# Patient Record
Sex: MALE | Race: BLACK OR AFRICAN AMERICAN | Employment: OTHER | URBAN - METROPOLITAN AREA
[De-identification: names, ages, dates, MRNs, and addresses within clinical notes are randomized per-mention and may not be internally consistent; named-entity substitution may affect disease eponyms.]

---

## 2017-03-22 PROBLEM — R56.9 SEIZURE (HCC): Status: ACTIVE | Noted: 2017-03-22

## 2017-05-06 PROBLEM — R56.9 SEIZURE (HCC): Chronic | Status: ACTIVE | Noted: 2017-03-22

## 2017-05-12 ENCOUNTER — HOSPITAL ENCOUNTER (OUTPATIENT)
Dept: LAB | Age: 67
Discharge: HOME | End: 2017-05-12
Attending: INTERNAL MEDICINE
Payer: COMMERCIAL

## 2017-05-12 LAB
CHLORIDE UR-SCNC: 149 MMOL/L
POTASSIUM UR-SCNC: 109 MMOL/L
SODIUM UR-SCNC: 46 MMOL/L

## 2017-05-12 PROCEDURE — 82436 ASSAY OF URINE CHLORIDE: CPT | Performed by: NURSE PRACTITIONER

## 2017-05-12 PROCEDURE — 84300 ASSAY OF URINE SODIUM: CPT | Performed by: NURSE PRACTITIONER

## 2017-05-12 PROCEDURE — 82384 ASSAY THREE CATECHOLAMINES: CPT | Performed by: NURSE PRACTITIONER

## 2017-05-12 PROCEDURE — 84133 ASSAY OF URINE POTASSIUM: CPT | Performed by: NURSE PRACTITIONER

## 2017-05-17 LAB
CREAT UR-MCNC: 255.7 MG/DL
DOPAMINE UG/G CREAT: 222 UG/G CREAT (ref 0–348)
DOPAMINE UR-MCNC: 567 UG/L
EPINEPH UR-MCNC: 46 UG/L
EPINEPHRINE UG/G CREAT: 18 UG/G CREAT (ref 0–19)
NOREPINEPH UR-MCNC: 116 UG/L
NOREPINEPHRINE UG/G CREAT: 45 UG/G CREAT (ref 0–111)

## 2017-09-22 ENCOUNTER — HOSPITAL ENCOUNTER (OUTPATIENT)
Dept: ULTRASOUND IMAGING | Age: 67
Discharge: HOME | End: 2017-09-22
Attending: UROLOGY
Payer: COMMERCIAL

## 2017-09-22 DIAGNOSIS — N50.89 SCROTAL MASS: ICD-10-CM

## 2017-09-22 DIAGNOSIS — N44.2 TESTICULAR CYST: ICD-10-CM

## 2017-09-22 PROCEDURE — 76870 US EXAM SCROTUM: CPT

## 2017-09-23 NOTE — PROGRESS NOTES
Please let pt know that he has several intratesticular and extratesticular cysts. No concerning masses. Will discuss at follow up.

## 2018-09-07 PROBLEM — E27.8 ADRENAL MASS (HCC): Status: ACTIVE | Noted: 2018-09-07

## 2019-03-20 PROBLEM — E53.8 VITAMIN B 12 DEFICIENCY: Status: ACTIVE | Noted: 2019-03-20

## 2019-03-20 PROBLEM — M10.00 IDIOPATHIC GOUT: Status: ACTIVE | Noted: 2019-03-20

## 2019-03-20 PROBLEM — E78.2 MIXED HYPERLIPIDEMIA: Status: ACTIVE | Noted: 2019-03-20

## 2019-03-20 PROBLEM — R60.0 LOCALIZED EDEMA: Status: ACTIVE | Noted: 2019-03-20

## 2019-07-12 ENCOUNTER — HOSPITAL ENCOUNTER (OUTPATIENT)
Dept: SURGERY | Age: 69
Discharge: HOME | End: 2019-07-12
Payer: MEDICARE

## 2019-07-12 VITALS
HEIGHT: 74 IN | TEMPERATURE: 98.2 F | OXYGEN SATURATION: 100 % | SYSTOLIC BLOOD PRESSURE: 158 MMHG | WEIGHT: 232.4 LBS | RESPIRATION RATE: 16 BRPM | BODY MASS INDEX: 29.82 KG/M2 | DIASTOLIC BLOOD PRESSURE: 84 MMHG

## 2019-07-12 LAB
ANION GAP SERPL CALC-SCNC: 9 MMOL/L (ref 7–16)
APPEARANCE UR: CLEAR
BACTERIA URNS QL MICRO: 0 /HPF
BILIRUB UR QL: NEGATIVE
BUN SERPL-MCNC: 13 MG/DL (ref 8–23)
CALCIUM SERPL-MCNC: 9.1 MG/DL (ref 8.3–10.4)
CHLORIDE SERPL-SCNC: 106 MMOL/L (ref 98–107)
CO2 SERPL-SCNC: 29 MMOL/L (ref 21–32)
COLOR UR: YELLOW
CREAT SERPL-MCNC: 1.01 MG/DL (ref 0.8–1.5)
ERYTHROCYTE [DISTWIDTH] IN BLOOD BY AUTOMATED COUNT: 13 % (ref 11.9–14.6)
GLUCOSE SERPL-MCNC: 85 MG/DL (ref 65–100)
GLUCOSE UR STRIP.AUTO-MCNC: NEGATIVE MG/DL
HCT VFR BLD AUTO: 36.4 % (ref 41.1–50.3)
HGB BLD-MCNC: 11.7 G/DL (ref 13.6–17.2)
HGB UR QL STRIP: NEGATIVE
KETONES UR QL STRIP.AUTO: NEGATIVE MG/DL
LEUKOCYTE ESTERASE UR QL STRIP.AUTO: NEGATIVE
MCH RBC QN AUTO: 28.4 PG (ref 26.1–32.9)
MCHC RBC AUTO-ENTMCNC: 32.1 G/DL (ref 31.4–35)
MCV RBC AUTO: 88.3 FL (ref 79.6–97.8)
NITRITE UR QL STRIP.AUTO: NEGATIVE
NRBC # BLD: 0 K/UL (ref 0–0.2)
PH UR STRIP: 8 [PH] (ref 5–9)
PLATELET # BLD AUTO: 210 K/UL (ref 150–450)
PMV BLD AUTO: 8.8 FL (ref 9.4–12.3)
POTASSIUM SERPL-SCNC: 3.4 MMOL/L (ref 3.5–5.1)
PROT UR STRIP-MCNC: NEGATIVE MG/DL
RBC # BLD AUTO: 4.12 M/UL (ref 4.23–5.6)
SODIUM SERPL-SCNC: 144 MMOL/L (ref 136–145)
SP GR UR REFRACTOMETRY: 1.01 (ref 1–1.02)
UROBILINOGEN UR QL STRIP.AUTO: 0.2 EU/DL (ref 0.2–1)
WBC # BLD AUTO: 4.7 K/UL (ref 4.3–11.1)

## 2019-07-12 PROCEDURE — 80048 BASIC METABOLIC PNL TOTAL CA: CPT

## 2019-07-12 PROCEDURE — 87086 URINE CULTURE/COLONY COUNT: CPT

## 2019-07-12 PROCEDURE — 81003 URINALYSIS AUTO W/O SCOPE: CPT

## 2019-07-12 PROCEDURE — 85027 COMPLETE CBC AUTOMATED: CPT

## 2019-07-12 RX ORDER — LANOLIN ALCOHOL/MO/W.PET/CERES
1000 CREAM (GRAM) TOPICAL
COMMUNITY

## 2019-07-12 NOTE — PERIOP NOTES
Patient verified name and     Order for consent found in EHR and matches case posting; patient verified. Type 1B surgery, assessment complete. Labs per surgeon: CBC, BMP, UA and Urine Culture  Labs per anesthesia protocol: none    Hibiclens and instructions given per hospital policy. Patient provided with and instructed on educational handouts including Guide to Surgery, Pain Management, Hand Hygiene, Blood Transfusion Education, and [facility] Brochure. Patient answered medical/surgical history questions at their best of ability. All prior to admission medications documented in [facility]. Original medication prescription list visualized during patient appointment. Patient instructed to hold all vitamins 7 days prior to surgery and NSAIDS 5 days prior to surgery, patient verbalized understanding. Prescription medications to hold: 2 of his daily 81 mg Aspirin. Nunu Gerardo with Dr. Pratik Godoy office to notify her that patient takes 3 of the 81mg Aspirins daily secondary to stroke and asked if they wanted him to remain on 243 mg daily or reduce to just one 81mg tablet until after surgery. Malini Yarbrough stated he is to cut down to just one 81 mg Aspirin daily until after surgery    Patient instructed to continue previous medications as prescribed prior to surgery and to take the following medications the day of surgery according to anesthesia guidelines with a small sip of water: Allopurinol, Amlodipine, Aspirin, Atenolol, Dilantin, Keppra, Phenytek and Ranitidine. Patient teach back successful and patient demonstrates knowledge of instructions.

## 2019-07-12 NOTE — PERIOP NOTES
Recent Results (from the past 12 hour(s))   CBC W/O DIFF    Collection Time: 07/12/19  1:35 PM   Result Value Ref Range    WBC 4.7 4.3 - 11.1 K/uL    RBC 4.12 (L) 4.23 - 5.6 M/uL    HGB 11.7 (L) 13.6 - 17.2 g/dL    HCT 36.4 (L) 41.1 - 50.3 %    MCV 88.3 79.6 - 97.8 FL    MCH 28.4 26.1 - 32.9 PG    MCHC 32.1 31.4 - 35.0 g/dL    RDW 13.0 11.9 - 14.6 %    PLATELET 235 769 - 854 K/uL    MPV 8.8 (L) 9.4 - 12.3 FL    ABSOLUTE NRBC 0.00 0.0 - 0.2 K/uL   METABOLIC PANEL, BASIC    Collection Time: 07/12/19  1:35 PM   Result Value Ref Range    Sodium 144 136 - 145 mmol/L    Potassium 3.4 (L) 3.5 - 5.1 mmol/L    Chloride 106 98 - 107 mmol/L    CO2 29 21 - 32 mmol/L    Anion gap 9 7 - 16 mmol/L    Glucose 85 65 - 100 mg/dL    BUN 13 8 - 23 MG/DL    Creatinine 1.01 0.8 - 1.5 MG/DL    GFR est AA >60 >60 ml/min/1.73m2    GFR est non-AA >60 >60 ml/min/1.73m2    Calcium 9.1 8.3 - 10.4 MG/DL   URINALYSIS W/ RFLX MICROSCOPIC    Collection Time: 07/12/19  1:35 PM   Result Value Ref Range    Color YELLOW      Appearance CLEAR      Specific gravity 1.010 1.001 - 1.023      pH (UA) 8.0 5.0 - 9.0      Protein NEGATIVE  NEG mg/dL    Glucose NEGATIVE  NEG mg/dL    Ketone NEGATIVE  NEG mg/dL    Bilirubin NEGATIVE  NEG      Blood NEGATIVE  NEG      Urobilinogen 0.2 0.2 - 1.0 EU/dL    Nitrites NEGATIVE  NEG      Leukocyte Esterase NEGATIVE  NEG      Bacteria 0 0 /hpf      Results reviewed, no further action required.

## 2019-07-15 LAB
BACTERIA SPEC CULT: NORMAL
SERVICE CMNT-IMP: NORMAL

## 2019-07-17 ENCOUNTER — ANESTHESIA EVENT (OUTPATIENT)
Dept: SURGERY | Age: 69
End: 2019-07-17
Payer: MEDICARE

## 2019-07-18 ENCOUNTER — ANESTHESIA (OUTPATIENT)
Dept: SURGERY | Age: 69
End: 2019-07-18
Payer: MEDICARE

## 2019-07-18 ENCOUNTER — HOSPITAL ENCOUNTER (OUTPATIENT)
Age: 69
Setting detail: OUTPATIENT SURGERY
Discharge: HOME | End: 2019-07-18
Attending: UROLOGY | Admitting: UROLOGY
Payer: MEDICARE

## 2019-07-18 VITALS
DIASTOLIC BLOOD PRESSURE: 73 MMHG | OXYGEN SATURATION: 95 % | SYSTOLIC BLOOD PRESSURE: 143 MMHG | BODY MASS INDEX: 29.79 KG/M2 | RESPIRATION RATE: 16 BRPM | HEART RATE: 62 BPM | WEIGHT: 232 LBS | TEMPERATURE: 98 F

## 2019-07-18 DIAGNOSIS — R31.0 GROSS HEMATURIA: Primary | ICD-10-CM

## 2019-07-18 LAB — POTASSIUM BLD-SCNC: 3.6 MMOL/L (ref 3.5–5.1)

## 2019-07-18 PROCEDURE — 76210000020 HC REC RM PH II FIRST 0.5 HR: Performed by: UROLOGY

## 2019-07-18 PROCEDURE — 74011250637 HC RX REV CODE- 250/637: Performed by: ANESTHESIOLOGY

## 2019-07-18 PROCEDURE — 84132 ASSAY OF SERUM POTASSIUM: CPT

## 2019-07-18 PROCEDURE — 88305 TISSUE EXAM BY PATHOLOGIST: CPT

## 2019-07-18 PROCEDURE — 74011250636 HC RX REV CODE- 250/636: Performed by: ANESTHESIOLOGY

## 2019-07-18 PROCEDURE — 77030019927 HC TBNG IRR CYSTO BAXT -A: Performed by: UROLOGY

## 2019-07-18 PROCEDURE — 76060000032 HC ANESTHESIA 0.5 TO 1 HR: Performed by: UROLOGY

## 2019-07-18 PROCEDURE — 74011250636 HC RX REV CODE- 250/636

## 2019-07-18 PROCEDURE — 77030010509 HC AIRWY LMA MSK TELE -A: Performed by: NURSE ANESTHETIST, CERTIFIED REGISTERED

## 2019-07-18 PROCEDURE — C1758 CATHETER, URETERAL: HCPCS | Performed by: UROLOGY

## 2019-07-18 PROCEDURE — 74011000250 HC RX REV CODE- 250

## 2019-07-18 PROCEDURE — 77030032490 HC SLV COMPR SCD KNE COVD -B: Performed by: UROLOGY

## 2019-07-18 PROCEDURE — 76010000138 HC OR TIME 0.5 TO 1 HR: Performed by: UROLOGY

## 2019-07-18 PROCEDURE — 74011250636 HC RX REV CODE- 250/636: Performed by: UROLOGY

## 2019-07-18 PROCEDURE — 76210000063 HC OR PH I REC FIRST 0.5 HR: Performed by: UROLOGY

## 2019-07-18 PROCEDURE — 74011636320 HC RX REV CODE- 636/320: Performed by: UROLOGY

## 2019-07-18 PROCEDURE — 77030018832 HC SOL IRR H20 ICUM -A: Performed by: UROLOGY

## 2019-07-18 RX ORDER — FENTANYL CITRATE 50 UG/ML
INJECTION, SOLUTION INTRAMUSCULAR; INTRAVENOUS AS NEEDED
Status: DISCONTINUED | OUTPATIENT
Start: 2019-07-18 | End: 2019-07-18 | Stop reason: HOSPADM

## 2019-07-18 RX ORDER — HYDROMORPHONE HYDROCHLORIDE 2 MG/ML
0.5 INJECTION, SOLUTION INTRAMUSCULAR; INTRAVENOUS; SUBCUTANEOUS
Status: DISCONTINUED | OUTPATIENT
Start: 2019-07-18 | End: 2019-07-18 | Stop reason: HOSPADM

## 2019-07-18 RX ORDER — LIDOCAINE HYDROCHLORIDE 20 MG/ML
INJECTION, SOLUTION EPIDURAL; INFILTRATION; INTRACAUDAL; PERINEURAL AS NEEDED
Status: DISCONTINUED | OUTPATIENT
Start: 2019-07-18 | End: 2019-07-18 | Stop reason: HOSPADM

## 2019-07-18 RX ORDER — MIDAZOLAM HYDROCHLORIDE 1 MG/ML
2 INJECTION, SOLUTION INTRAMUSCULAR; INTRAVENOUS
Status: DISCONTINUED | OUTPATIENT
Start: 2019-07-18 | End: 2019-07-18 | Stop reason: HOSPADM

## 2019-07-18 RX ORDER — CIPROFLOXACIN 2 MG/ML
400 INJECTION, SOLUTION INTRAVENOUS
Status: COMPLETED | OUTPATIENT
Start: 2019-07-18 | End: 2019-07-18

## 2019-07-18 RX ORDER — ACETAMINOPHEN 500 MG
1000 TABLET ORAL ONCE
Status: COMPLETED | OUTPATIENT
Start: 2019-07-18 | End: 2019-07-18

## 2019-07-18 RX ORDER — EPHEDRINE SULFATE 50 MG/ML
INJECTION, SOLUTION INTRAVENOUS AS NEEDED
Status: DISCONTINUED | OUTPATIENT
Start: 2019-07-18 | End: 2019-07-18 | Stop reason: HOSPADM

## 2019-07-18 RX ORDER — ONDANSETRON 2 MG/ML
INJECTION INTRAMUSCULAR; INTRAVENOUS AS NEEDED
Status: DISCONTINUED | OUTPATIENT
Start: 2019-07-18 | End: 2019-07-18 | Stop reason: HOSPADM

## 2019-07-18 RX ORDER — OXYCODONE HYDROCHLORIDE 5 MG/1
10 TABLET ORAL
Status: DISCONTINUED | OUTPATIENT
Start: 2019-07-18 | End: 2019-07-18 | Stop reason: HOSPADM

## 2019-07-18 RX ORDER — HYDROCODONE BITARTRATE AND ACETAMINOPHEN 5; 325 MG/1; MG/1
1 TABLET ORAL
Qty: 10 TAB | Refills: 0 | Status: SHIPPED | OUTPATIENT
Start: 2019-07-18 | End: 2019-07-21

## 2019-07-18 RX ORDER — LIDOCAINE HYDROCHLORIDE 10 MG/ML
0.3 INJECTION INFILTRATION; PERINEURAL ONCE
Status: DISCONTINUED | OUTPATIENT
Start: 2019-07-18 | End: 2019-07-18 | Stop reason: HOSPADM

## 2019-07-18 RX ORDER — GLYCOPYRROLATE 0.2 MG/ML
INJECTION INTRAMUSCULAR; INTRAVENOUS AS NEEDED
Status: DISCONTINUED | OUTPATIENT
Start: 2019-07-18 | End: 2019-07-18 | Stop reason: HOSPADM

## 2019-07-18 RX ORDER — PROPOFOL 10 MG/ML
INJECTION, EMULSION INTRAVENOUS AS NEEDED
Status: DISCONTINUED | OUTPATIENT
Start: 2019-07-18 | End: 2019-07-18 | Stop reason: HOSPADM

## 2019-07-18 RX ORDER — DEXAMETHASONE SODIUM PHOSPHATE 4 MG/ML
INJECTION, SOLUTION INTRA-ARTICULAR; INTRALESIONAL; INTRAMUSCULAR; INTRAVENOUS; SOFT TISSUE AS NEEDED
Status: DISCONTINUED | OUTPATIENT
Start: 2019-07-18 | End: 2019-07-18 | Stop reason: HOSPADM

## 2019-07-18 RX ORDER — SODIUM CHLORIDE, SODIUM LACTATE, POTASSIUM CHLORIDE, CALCIUM CHLORIDE 600; 310; 30; 20 MG/100ML; MG/100ML; MG/100ML; MG/100ML
100 INJECTION, SOLUTION INTRAVENOUS CONTINUOUS
Status: DISCONTINUED | OUTPATIENT
Start: 2019-07-18 | End: 2019-07-18 | Stop reason: HOSPADM

## 2019-07-18 RX ADMIN — PROPOFOL 200 MG: 10 INJECTION, EMULSION INTRAVENOUS at 12:17

## 2019-07-18 RX ADMIN — GLYCOPYRROLATE 0.2 MG: 0.2 INJECTION INTRAMUSCULAR; INTRAVENOUS at 12:36

## 2019-07-18 RX ADMIN — DEXAMETHASONE SODIUM PHOSPHATE 4 MG: 4 INJECTION, SOLUTION INTRA-ARTICULAR; INTRALESIONAL; INTRAMUSCULAR; INTRAVENOUS; SOFT TISSUE at 12:24

## 2019-07-18 RX ADMIN — EPHEDRINE SULFATE 10 MG: 50 INJECTION, SOLUTION INTRAVENOUS at 12:40

## 2019-07-18 RX ADMIN — FENTANYL CITRATE 25 MCG: 50 INJECTION, SOLUTION INTRAMUSCULAR; INTRAVENOUS at 12:24

## 2019-07-18 RX ADMIN — LIDOCAINE HYDROCHLORIDE 100 MG: 20 INJECTION, SOLUTION EPIDURAL; INFILTRATION; INTRACAUDAL; PERINEURAL at 12:17

## 2019-07-18 RX ADMIN — ONDANSETRON 4 MG: 2 INJECTION INTRAMUSCULAR; INTRAVENOUS at 12:24

## 2019-07-18 RX ADMIN — SODIUM CHLORIDE, SODIUM LACTATE, POTASSIUM CHLORIDE, AND CALCIUM CHLORIDE 100 ML/HR: 600; 310; 30; 20 INJECTION, SOLUTION INTRAVENOUS at 11:02

## 2019-07-18 RX ADMIN — CIPROFLOXACIN 400 MG: 2 INJECTION, SOLUTION INTRAVENOUS at 12:17

## 2019-07-18 RX ADMIN — ACETAMINOPHEN 1000 MG: 500 TABLET, FILM COATED ORAL at 11:01

## 2019-07-18 NOTE — BRIEF OP NOTE
BRIEF OPERATIVE NOTE    Date of Procedure: 7/18/2019   Preoperative Diagnosis: Lesion of bladder [N32.9]  Gross hematuria [R31.0]  Postoperative Diagnosis: Lesion of bladder [N32.9]  Gross hematuria [R31.0]    Procedure(s):  CYSTOSCOPY WITH BLADDER BIOPSIES W/ FULGERATION AND BILATERAL RETROGRADE PYELOGRAMS  Surgeon(s) and Role:     * Valentino Hawk DO - Primary         Surgical Assistant: None    Surgical Staff:  Circ-1: Judson Gold  Circ-2: Rene Leos RN  Event Time In Time Out   Incision Start 1236    Incision Close 1245      Anesthesia: General   Estimated Blood Loss: <5cc  Specimens:   ID Type Source Tests Collected by Time Destination   1 : bladder biopsies X2 Preservative Bladder  Domi Palomino DO 7/18/2019 1237 Pathology      Findings: see op note  Complications: none immediate  Implants: * No implants in log *

## 2019-07-18 NOTE — DISCHARGE INSTRUCTIONS
Patient Education        Cystoscopy: What to Expect at [address] [facility]    A cystoscopy is a procedure that lets a doctor look inside of the bladder and the urethra. The urethra is the tube that carries urine from the bladder to outside the body. The doctor uses a thin, lighted tool called a cystoscope. Your bladder is filled with fluid. This stretches the bladder so that your doctor can look closely at the inside of your bladder. After the cystoscopy, your urethra may be sore at first, and it may burn when you urinate for the first few days after the procedure. You may feel the need to urinate more often, and your urine may be pink. These symptoms should get better in 1 or 2 days. You will probably be able to go back to most of your usual activities in 1 or 2 days. This care sheet gives you a general idea about how long it will take for you to recover. But each person recovers at a different pace. Follow the steps below to get better as quickly as possible. How can you care for yourself at home? Activity    · Rest when you feel tired. Getting enough sleep will help you recover.     · Try to walk each day. Start by walking a little more than you did the day before. Bit by bit, increase the amount you walk. Walking boosts blood flow and helps prevent pneumonia and constipation.     · Avoid strenuous activities, such as bicycle riding, jogging, weight lifting, or aerobic exercise, until your doctor says it is okay.     · Ask your doctor when you can drive again.     · Most people are able to return to work within 1 or 2 days after the procedure.     · You may shower and take baths as usual.     · Ask your doctor when it is okay for you to have sex. Diet    · You can eat your normal diet. If your stomach is upset, try bland, low-fat foods like plain rice, broiled chicken, toast, and yogurt.     · Drink plenty of fluids (unless your doctor tells you not to).    Medicines    · Take pain medicines exactly as directed. ? If the doctor gave you a prescription medicine for pain, take it as prescribed. ? If you are not taking a prescription pain medicine, ask your doctor if you can take an over-the-counter medicine.     · If you think your pain medicine is making you sick to your stomach:  ? Take your medicine after meals (unless your doctor has told you not to). ? Ask your doctor for a different pain medicine.     · If your doctor prescribed antibiotics, take them as directed. Do not stop taking them just because you feel better. You need to take the full course of antibiotics. Follow-up care is a key part of your treatment and safety. Be sure to make and go to all appointments, and call your doctor if you are having problems. It's also a good idea to know your test results and keep a list of the medicines you take. When should you call for help? Call 911 anytime you think you may need emergency care. For example, call if:    · You passed out (lost consciousness).     · You have severe trouble breathing.     · You have sudden chest pain and shortness of breath, or you cough up blood.     · You have severe belly pain.    Call your doctor now or seek immediate medical care if:    · You are sick to your stomach or cannot keep fluids down.     · Your urine is still red or you see blood clots after you have urinated several times.     · You have trouble passing urine or stool, especially if you have pain or swelling in your lower belly.     · You have signs of a blood clot, such as:  ? Pain in your calf, back of the knee, thigh, or groin. ? Redness and swelling in your leg or groin.     · You develop a fever or severe chills.     · You have pain in your back just below your rib cage. This is called flank pain.    Watch closely for changes in your health, and be sure to contact your doctor if:    · You have pain or burning when you urinate.  A burning feeling is normal for a day or two after the test, but call if it does not get better.     · You have a frequent urge to urinate but can pass only small amounts of urine.     · Your urine is pink, red, or cloudy, or smells bad. It is normal for the urine to have a pinkish color for a few days after the test, but call if it does not get better. Where can you learn more? Go to http://nichelle-namrata.info/. Enter J372 in the search box to learn more about \"Cystoscopy: What to Expect at Home. \"  Current as of: March 20, 2018  Content Version: 11.9  © 5020-1565 [employer]. Care instructions adapted under license by [employer] (which disclaims liability or warranty for this information). If you have questions about a medical condition or this instruction, always ask your healthcare professional. [place] [address] any warranty or liability for your use of this information. TYPICAL SIDE EFFECTS OF PAIN MEDICATION:  *    Constipation: Drink lots of fluids. Over the counter stool softener if needed. *    Nausea: Take pain medication with food. Call your doctor with persistent nausea. ACTIVITY  · As tolerated and as directed by your doctor. · Bathe or shower as directed by your doctor. DIET  · Day of surgery: Clear liquids until no nausea or vomiting; small portion, light diet [place] foods (ex: baked chicken, plain rice, grits, scrambled eggs, toast). Nothing greasy, fried or spicy today. · Advance to regular diet on second day, unless your doctor orders otherwise. · If nausea and vomiting continues, call your doctor. PAIN  · Take pain medication as directed by your doctor. · DO NOT take aspirin or blood thinners unless directed by your doctor.        CALL YOUR DOCTOR IF    s Call your doctor if pain is NOT relieved by medication.   s Excessive bleeding that does not stop after holding pressure over the area  · Temperature of 101 degrees F or above  · Excessive redness, swelling or bruising, and/ or green or yellow, smelly discharge from incision    AFTER ANESTHESIA   · For the first 24 hours: DO NOT Drive, Drink alcoholic beverages, or Make important decisions. · Be aware of dizziness following anesthesia and while taking pain medication. DISCHARGE SUMMARY from Nurse    PATIENT INSTRUCTIONS:    After general anesthesia or intravenous sedation, for 24 hours or while taking prescription Narcotics:  · Limit your activities  · Do not drive and operate hazardous machinery  · Do not make important personal or business decisions  · Do  not drink alcoholic beverages  · If you have not urinated within 8 hours after discharge, please contact your surgeon on call. *  Please give a list of your current medications to your Primary Care Provider. *  Please update this list whenever your medications are discontinued, doses are      changed, or new medications (including over-the-counter products) are added. *  Please carry medication information at all times in case of emergency situations. Preventing Infection at Home  We care about preventing infection and avoiding the spread of germs - not only when you are in the hospital but also when you return home. When you return home from the hospital, its important to take the following steps to help prevent infection and avoid spreading germs that could infect you and others. Ask everyone in your home to follow these guidelines, too. Clean Your Hands  · Clean your hands whenever your hands are visibly dirty, before you eat, before or after touching your mouth, nose or eyes, and before preparing food. Clean them after contact with body fluids, using the restroom, touching animals or changing diapers. · When washing hands, wet them with warm water and work up a lather. Rub hands for at least 15 seconds, then rinse them and pat them dry with a clean towel or paper towel. · When using hand sanitizers, it should take about 15 seconds to rub your hands dry.  If not, you probably didnt apply enough . Cover Your Sneeze or Cough  Germs are released into the air whenever you sneeze or cough. To prevent the spread of infection:  · Turn away from other people before coughing or sneezing. · Cover your mouth or nose with a tissue when you cough or sneeze. Put the tissue in the trash. · If you dont have a tissue, cough or sneeze into your upper sleeve, not your hands. · Always clean your hands after coughing or sneezing. Care for Wounds  Your skin is your bodys first line of defense against germs, but an open wound leaves an easy way for germs to enter your body. To prevent infection:  · Clean your hands before and after changing wound dressings, and wear gloves to change dressings if recommended by your doctor. · Take special care with IV lines or other devices inserted into the body. If you must touch them, clean your hands first.  · Follow any specific instructions from your doctor to care for your wounds. Contact your doctor if you experience any signs of infection, such as fever or increased redness at the surgical or wound site. Keep a Clean Home  · Clean or wipe commonly touched hard surfaces like door handles, sinks, tabletops, phones and TV remotes. · Use products labeled disinfectant to kill harmful bacteria and viruses. · Use a clean cloth or paper towel to clean and dry surfaces. Wiping surfaces with a dirty dishcloth, sponge or towel will only spread germs. · Never share toothbrushes, ross, drinking glasses, utensils, razor blades, face cloths or bath towels to avoid spreading germs. · Be sure that the linens that you sleep on are clean. · Keep pets away from wounds and wash your hands after touching pets, their toys or bedding. We care about you and your health. Remember, preventing infections is a team effort between you, your family, friends and health care providers.        These are general instructions for a healthy lifestyle:    No smoking/ No tobacco products/ Avoid exposure to second hand smoke    Surgeon General's Warning:  Quitting smoking now greatly reduces serious risk to your health. Obesity, smoking, and sedentary lifestyle greatly increases your risk for illness    A healthy diet, regular physical exercise & weight monitoring are important for maintaining a healthy lifestyle    You may be retaining fluid if you have a history of heart failure or if you experience any of the following symptoms:  Weight gain of 3 pounds or more overnight or 5 pounds in a week, increased swelling in our hands or feet or shortness of breath while lying flat in bed. Please call your doctor as soon as you notice any of these symptoms; do not wait until your next office visit. Recognize signs and symptoms of STROKE:    F-face looks uneven    A-arms unable to move or move unevenly    S-speech slurred or non-existent    T-time-call 911 as soon as signs and symptoms begin-DO NOT go       Back to bed or wait to see if you get better-TIME IS BRAIN.

## 2019-07-18 NOTE — ANESTHESIA PREPROCEDURE EVALUATION
Relevant Problems   No relevant active problems       Anesthetic History               Review of Systems / Medical History  Patient summary reviewed and pertinent labs reviewed    Pulmonary        Sleep apnea           Neuro/Psych     seizures  CVA: no residual symptoms       Cardiovascular    Hypertension              Exercise tolerance: >4 METS     GI/Hepatic/Renal     GERD      PUD     Endo/Other             Other Findings              Physical Exam    Airway  Mallampati: II  TM Distance: > 6 cm  Neck ROM: normal range of motion   Mouth opening: Normal     Cardiovascular  Regular rate and rhythm,  S1 and S2 normal,  no murmur, click, rub, or gallop  Rhythm: regular  Rate: normal         Dental    Dentition: Full upper dentures     Pulmonary  Breath sounds clear to auscultation               Abdominal         Other Findings            Anesthetic Plan    ASA: 3  Anesthesia type: general            Anesthetic plan and risks discussed with: Patient

## 2019-07-18 NOTE — ANESTHESIA POSTPROCEDURE EVALUATION
Procedure(s):  CYSTOSCOPY WITH BLADDER BIOPSIES W/ FULGARATION AND BILATERAL RETROGRADE PYELOGRAM.    general    Anesthesia Post Evaluation      Multimodal analgesia: multimodal analgesia used between 6 hours prior to anesthesia start to PACU discharge  Patient location during evaluation: PACU  Patient participation: complete - patient participated  Level of consciousness: awake  Pain management: adequate  Airway patency: patent  Anesthetic complications: no  Cardiovascular status: acceptable  Respiratory status: acceptable  Hydration status: acceptable  Post anesthesia nausea and vomiting:  none      Vitals Value Taken Time   /68 7/18/2019  1:15 PM   Temp 36.7 °C (98 °F) 7/18/2019  1:15 PM   Pulse 58 7/18/2019  1:16 PM   Resp 18 7/18/2019  1:15 PM   SpO2 98 % 7/18/2019  1:16 PM   Vitals shown include unvalidated device data.

## 2019-07-19 NOTE — OP NOTES
[address] [facility]  OPERATIVE REPORT    Name:  Donna Gaxiola  MR#:  [id number]  :  1950  ACCOUNT #:  [de-identified]  DATE OF SERVICE:  2019      PREOPERATIVE DIAGNOSES:  Gross hematuria and abnormal bladder urothelium. POSTOPERATIVE DIAGNOSES:  Gross hematuria and abnormal bladder urothelium. PROCEDURES PERFORMED:  Cystoscopy, bladder biopsies, fulguration of abnormal bladder urothelium, and bilateral retrograde pyelograms. SURGEON:  Henry Hawk DO    ASSISTANT:  None. ANESTHESIA:  General.    SPECIMENS REMOVED:  Bladder biopsies. COMPLICATIONS:  None immediate. ESTIMATED BLOOD LOSS:  <5cc    IMPLANTS:  none    CLINICAL HISTORY:  This is a 79-year-old gentleman who I have been following for a history of gross hematuria. Recent cystoscopy showed some abnormal bladder urothelium. I have recommended the above-mentioned procedures. All risks, benefits and alternatives to the procedure have been reviewed and he is willing to proceed at this time. DESCRIPTION OF OPERATIVE PROCEDURE:  The patient consent was obtained. The patient was brought back to the operating room at which time he was placed in the supine position. After the uneventful induction of general anesthesia, he was then placed in a dorsal lithotomy position. His genital area was prepped and draped and a sterile field applied. A 22-Burmese cystoscope was inserted into the urethra and advanced into the bladder under direct visualization a mild bulbourethral stricture was noted that easily accepted the scope. Mild bilobar hypertrophy of the prostate was seen. The bladder was systematically surveyed. Two small areas of raised and injected urothelium were seen on the posterior bladder wall. Both of these areas were biopsied using a cold cup biopsy forceps. All abnormal urothelium as well as biopsy sites were fulgurated.   I inspected the bladder multiple times to ensure there were no other mucosal abnormalities noted. None were seen. Single bilateral ureteral orifices were seen in their normal orthotopic position. Bilateral retrograde pyelogram was performed using a 5-Bahraini cone-tipped catheter. No filling defects or dilation was seen bilaterally. Prompt excretion was noted bilaterally. The patient's bladder was drained. The procedure was terminated. The patient tolerated the procedure well. I plan to see him back in the office in two weeks. I will reattempt to schedule his CT prior to this appointment. INTERPRETATION OF BILATERAL RETROGRADE PYELOGRAMS:  Bilateral retrograde pyelograms were performed using a 5-Bahraini cone-tipped catheter. No filling defects or dilation was seen bilaterally.       LILA ELIAS DO      [id number]  D:  07/18/2019 12:54  T:  07/18/2019 13:02  JOB #:  [id number]

## 2019-07-25 PROBLEM — N30.21 CHRONIC CYSTITIS WITH HEMATURIA: Status: ACTIVE | Noted: 2019-07-25

## 2019-08-13 ENCOUNTER — HOSPITAL ENCOUNTER (OUTPATIENT)
Dept: CT IMAGING | Age: 69
Discharge: HOME | End: 2019-08-13
Attending: UROLOGY
Payer: MEDICARE

## 2019-08-13 VITALS — HEIGHT: 74 IN | BODY MASS INDEX: 29.52 KG/M2 | WEIGHT: 230 LBS

## 2019-08-13 DIAGNOSIS — N28.89 RENAL MASS: ICD-10-CM

## 2019-08-13 DIAGNOSIS — R31.0 GROSS HEMATURIA: ICD-10-CM

## 2019-08-13 LAB — CREAT BLD-MCNC: 0.9 MG/DL (ref 0.8–1.5)

## 2019-08-13 PROCEDURE — 74011636320 HC RX REV CODE- 636/320: Performed by: UROLOGY

## 2019-08-13 PROCEDURE — 82565 ASSAY OF CREATININE: CPT

## 2019-08-13 PROCEDURE — 74178 CT ABD&PLV WO CNTR FLWD CNTR: CPT

## 2019-08-13 PROCEDURE — 74011000258 HC RX REV CODE- 258: Performed by: UROLOGY

## 2019-08-13 RX ORDER — SODIUM CHLORIDE 0.9 % (FLUSH) 0.9 %
10 SYRINGE (ML) INJECTION
Status: COMPLETED | OUTPATIENT
Start: 2019-08-13 | End: 2019-08-13

## 2019-08-13 RX ADMIN — Medication 10 ML: at 15:30

## 2019-08-13 RX ADMIN — SODIUM CHLORIDE 100 ML: 900 INJECTION, SOLUTION INTRAVENOUS at 15:30

## 2019-08-13 RX ADMIN — IOPAMIDOL 100 ML: 755 INJECTION, SOLUTION INTRAVENOUS at 15:30

## 2019-09-19 ENCOUNTER — HOSPITAL ENCOUNTER (OUTPATIENT)
Dept: GENERAL RADIOLOGY | Age: 69
Discharge: HOME | End: 2019-09-19
Attending: UROLOGY
Payer: MEDICARE

## 2019-09-19 ENCOUNTER — HOSPITAL ENCOUNTER (OUTPATIENT)
Dept: SURGERY | Age: 69
Discharge: HOME | End: 2019-09-19
Payer: MEDICARE

## 2019-09-19 VITALS
WEIGHT: 232 LBS | HEART RATE: 55 BPM | DIASTOLIC BLOOD PRESSURE: 87 MMHG | SYSTOLIC BLOOD PRESSURE: 153 MMHG | TEMPERATURE: 98.5 F | OXYGEN SATURATION: 99 % | BODY MASS INDEX: 29.77 KG/M2 | RESPIRATION RATE: 18 BRPM | HEIGHT: 74 IN

## 2019-09-19 LAB
ALBUMIN SERPL-MCNC: 4.1 G/DL (ref 3.2–4.6)
ALBUMIN/GLOB SERPL: 1.2 {RATIO} (ref 1.2–3.5)
ALP SERPL-CCNC: 150 U/L (ref 50–136)
ALT SERPL-CCNC: 21 U/L (ref 12–65)
ANION GAP SERPL CALC-SCNC: 7 MMOL/L (ref 7–16)
APPEARANCE UR: CLEAR
APTT PPP: 25.2 SEC (ref 24.7–39.8)
AST SERPL-CCNC: 21 U/L (ref 15–37)
BACTERIA URNS QL MICRO: 0 /HPF
BILIRUB SERPL-MCNC: 0.2 MG/DL (ref 0.2–1.1)
BILIRUB UR QL: NEGATIVE
BUN SERPL-MCNC: 17 MG/DL (ref 8–23)
CALCIUM SERPL-MCNC: 9.7 MG/DL (ref 8.3–10.4)
CASTS URNS QL MICRO: ABNORMAL /LPF
CHLORIDE SERPL-SCNC: 108 MMOL/L (ref 98–107)
CO2 SERPL-SCNC: 28 MMOL/L (ref 21–32)
COLOR UR: YELLOW
CREAT SERPL-MCNC: 1.05 MG/DL (ref 0.8–1.5)
EPI CELLS #/AREA URNS HPF: ABNORMAL /HPF
ERYTHROCYTE [DISTWIDTH] IN BLOOD BY AUTOMATED COUNT: 13 % (ref 11.9–14.6)
GLOBULIN SER CALC-MCNC: 3.4 G/DL (ref 2.3–3.5)
GLUCOSE SERPL-MCNC: 77 MG/DL (ref 65–100)
GLUCOSE UR STRIP.AUTO-MCNC: NEGATIVE MG/DL
HCT VFR BLD AUTO: 37.7 % (ref 41.1–50.3)
HGB BLD-MCNC: 12.1 G/DL (ref 13.6–17.2)
HGB UR QL STRIP: NEGATIVE
INR PPP: 1
KETONES UR QL STRIP.AUTO: NEGATIVE MG/DL
LEUKOCYTE ESTERASE UR QL STRIP.AUTO: NEGATIVE
MCH RBC QN AUTO: 28.4 PG (ref 26.1–32.9)
MCHC RBC AUTO-ENTMCNC: 32.1 G/DL (ref 31.4–35)
MCV RBC AUTO: 88.5 FL (ref 79.6–97.8)
NITRITE UR QL STRIP.AUTO: NEGATIVE
NRBC # BLD: 0 K/UL (ref 0–0.2)
PH UR STRIP: 7 [PH] (ref 5–9)
PLATELET # BLD AUTO: 166 K/UL (ref 150–450)
PMV BLD AUTO: 10 FL (ref 9.4–12.3)
POTASSIUM SERPL-SCNC: 3.6 MMOL/L (ref 3.5–5.1)
PROT SERPL-MCNC: 7.5 G/DL (ref 6.3–8.2)
PROT UR STRIP-MCNC: ABNORMAL MG/DL
PROTHROMBIN TIME: 13.3 SEC (ref 11.7–14.5)
RBC # BLD AUTO: 4.26 M/UL (ref 4.23–5.6)
RBC #/AREA URNS HPF: ABNORMAL /HPF
SODIUM SERPL-SCNC: 143 MMOL/L (ref 136–145)
SP GR UR REFRACTOMETRY: 1.01 (ref 1–1.02)
UROBILINOGEN UR QL STRIP.AUTO: 0.2 EU/DL (ref 0.2–1)
WBC # BLD AUTO: 4.2 K/UL (ref 4.3–11.1)
WBC URNS QL MICRO: ABNORMAL /HPF

## 2019-09-19 PROCEDURE — 80053 COMPREHEN METABOLIC PANEL: CPT

## 2019-09-19 PROCEDURE — 85730 THROMBOPLASTIN TIME PARTIAL: CPT

## 2019-09-19 PROCEDURE — 87086 URINE CULTURE/COLONY COUNT: CPT

## 2019-09-19 PROCEDURE — 81003 URINALYSIS AUTO W/O SCOPE: CPT

## 2019-09-19 PROCEDURE — 93005 ELECTROCARDIOGRAM TRACING: CPT | Performed by: ANESTHESIOLOGY

## 2019-09-19 PROCEDURE — 85027 COMPLETE CBC AUTOMATED: CPT

## 2019-09-19 PROCEDURE — 85610 PROTHROMBIN TIME: CPT

## 2019-09-19 PROCEDURE — 36415 COLL VENOUS BLD VENIPUNCTURE: CPT

## 2019-09-19 PROCEDURE — 71046 X-RAY EXAM CHEST 2 VIEWS: CPT

## 2019-09-19 NOTE — PERIOP NOTES
Patient verified name and . Patient provided medical/health information and PTA medications to the best of their ability. TYPE  CASE:3  Order for consent yes found in EHR and matches case posting. Labs per surgeon:cbc,bmp,pt,ptt urine,urine cx,type and cross. Results: -  Labs per anesthesia protocol: none. Results -  EKG  :  yes    Patient provided with and instructed on education handouts including Guide to Surgery, blood transfusions, pain management, and hand hygiene for the family and community, and [place] [address] [facility] brochure.     hibiclens and instructions given per hospital policy. Instructed patient to continue previous medications as prescribed prior to surgery unless otherwise directed and to take the following medications the day of surgery according to anesthesia guidelines : allopurinol,amlodipine,dilantin,atenolol,keppra,phentoyn,zantac, start aspirin 81 mg at night. Instructed patient to hold  the following medications: aspirin 243 mg,bit b-12. Original medication prescription bottles none visualized during patient appointment. Patient teach back successful and patient demonstrates knowledge of instruction.

## 2019-09-19 NOTE — PERIOP NOTES
Pt instructed to return to outpatient entrance on 9-25-29 between 8-4 tor type and cross-lab draw.  Pt verbalized understanding

## 2019-09-21 LAB
ATRIAL RATE: 48 BPM
CALCULATED P AXIS, ECG09: 81 DEGREES
CALCULATED R AXIS, ECG10: 37 DEGREES
CALCULATED T AXIS, ECG11: 83 DEGREES
DIAGNOSIS, 93000: NORMAL
P-R INTERVAL, ECG05: 268 MS
Q-T INTERVAL, ECG07: 436 MS
QRS DURATION, ECG06: 118 MS
QTC CALCULATION (BEZET), ECG08: 389 MS
VENTRICULAR RATE, ECG03: 48 BPM

## 2019-09-22 LAB
BACTERIA SPEC CULT: NORMAL
SERVICE CMNT-IMP: NORMAL

## 2019-09-25 ENCOUNTER — ANESTHESIA EVENT (OUTPATIENT)
Dept: SURGERY | Age: 69
DRG: 658 | End: 2019-09-25
Payer: MEDICARE

## 2019-09-25 NOTE — PERIOP NOTES
At 2 pm, left voice mail message for patient on home phone requesting return call. Patient has not yet had T&C and surgery is scheduled for tomorrow. Called again [phone] and reached patient. Discussed that he needs T&C today for surgery tomorrow. Verified with main registration that if patient comes in today, before 5 pm, can still get this done. If after 5 pm, needs to go through ED to get T&C. Patient concerned it will \"get dark\" before he can get this done and get back home. Reassured to please come now as this is needed for surgery tomorrow. Patient states he \"can't make any promises. \"  Self called and spoke with Gayathri Fraser RN in main preop to inform.

## 2019-09-25 NOTE — PERIOP NOTES
Received phone call from patient. He states he cannot come today for T&C as he is doing bowel prep. Self spoke to Noah Chavez RN, in main preop. Discussed that patient can come in an extra hour earlier to get done. Patient to be here at 6:15 am.  Self informed patient of this. He states there is no way he can be here at that time and surgery will need to be cancelled and rescheduled. Provided phone number to surgeon office for patient to discuss with . Self called and spoke to [facility] scheduler to advise of this. She states she will call patient.

## 2019-09-26 ENCOUNTER — ANESTHESIA (OUTPATIENT)
Dept: SURGERY | Age: 69
DRG: 658 | End: 2019-09-26
Payer: MEDICARE

## 2019-10-16 ENCOUNTER — HOSPITAL ENCOUNTER (OUTPATIENT)
Dept: LAB | Age: 69
Discharge: HOME | DRG: 658 | End: 2019-10-16
Payer: MEDICARE

## 2019-10-17 ENCOUNTER — HOSPITAL ENCOUNTER (INPATIENT)
Age: 69
LOS: 3 days | Discharge: HOME | DRG: 658 | End: 2019-10-20
Attending: UROLOGY | Admitting: UROLOGY
Payer: MEDICARE

## 2019-10-17 ENCOUNTER — APPOINTMENT (OUTPATIENT)
Dept: GENERAL RADIOLOGY | Age: 69
DRG: 658 | End: 2019-10-17
Attending: ANESTHESIOLOGY
Payer: MEDICARE

## 2019-10-17 DIAGNOSIS — N28.89 RENAL MASS: Primary | ICD-10-CM

## 2019-10-17 LAB
HCT VFR BLD AUTO: 30.6 % (ref 41.1–50.3)
HGB BLD-MCNC: 10 G/DL (ref 13.6–17.2)

## 2019-10-17 PROCEDURE — 77030002896 HC SUT CLP ABSRB J&J -B: Performed by: UROLOGY

## 2019-10-17 PROCEDURE — 88305 TISSUE EXAM BY PATHOLOGIST: CPT

## 2019-10-17 PROCEDURE — 74011250636 HC RX REV CODE- 250/636

## 2019-10-17 PROCEDURE — 77030035048 HC TRCR ENDOSC OPTCL COVD -B: Performed by: UROLOGY

## 2019-10-17 PROCEDURE — 77030002916 HC SUT ETHLN J&J -A: Performed by: UROLOGY

## 2019-10-17 PROCEDURE — 76010000172 HC OR TIME 2.5 TO 3 HR INTENSV-TIER 1: Performed by: UROLOGY

## 2019-10-17 PROCEDURE — 77030008606 HC TRCR ENDOSC KII AMR -B: Performed by: UROLOGY

## 2019-10-17 PROCEDURE — 74011000250 HC RX REV CODE- 250: Performed by: UROLOGY

## 2019-10-17 PROCEDURE — 74011250636 HC RX REV CODE- 250/636: Performed by: ANESTHESIOLOGY

## 2019-10-17 PROCEDURE — 77030031139 HC SUT VCRL2 J&J -A: Performed by: UROLOGY

## 2019-10-17 PROCEDURE — 77030014647 HC SEAL FBRN TISSL BAXT -D: Performed by: UROLOGY

## 2019-10-17 PROCEDURE — 71045 X-RAY EXAM CHEST 1 VIEW: CPT

## 2019-10-17 PROCEDURE — 74011250636 HC RX REV CODE- 250/636: Performed by: UROLOGY

## 2019-10-17 PROCEDURE — 77030014008 HC SPNG HEMSTAT J&J -C: Performed by: UROLOGY

## 2019-10-17 PROCEDURE — C1751 CATH, INF, PER/CENT/MIDLINE: HCPCS | Performed by: ANESTHESIOLOGY

## 2019-10-17 PROCEDURE — 77030006984: Performed by: UROLOGY

## 2019-10-17 PROCEDURE — 77030018390 HC SPNG HEMSTAT2 J&J -B: Performed by: UROLOGY

## 2019-10-17 PROCEDURE — 77030040361 HC SLV COMPR DVT MDII -B: Performed by: UROLOGY

## 2019-10-17 PROCEDURE — 77030039425 HC BLD LARYNG TRULITE DISP TELE -A: Performed by: ANESTHESIOLOGY

## 2019-10-17 PROCEDURE — 74011000258 HC RX REV CODE- 258: Performed by: UROLOGY

## 2019-10-17 PROCEDURE — 76060000036 HC ANESTHESIA 2.5 TO 3 HR: Performed by: UROLOGY

## 2019-10-17 PROCEDURE — 77030018875 HC APPL CLP LIG4 J&J -B: Performed by: UROLOGY

## 2019-10-17 PROCEDURE — 76210000063 HC OR PH I REC FIRST 0.5 HR: Performed by: UROLOGY

## 2019-10-17 PROCEDURE — 77030009407 HC ELECTRD ENDO COVD -B: Performed by: UROLOGY

## 2019-10-17 PROCEDURE — 77030008462 HC STPLR SKN PROX J&J -A: Performed by: UROLOGY

## 2019-10-17 PROCEDURE — 77030014007 HC SPNG HEMSTAT J&J -B: Performed by: UROLOGY

## 2019-10-17 PROCEDURE — 77030010517 HC APPL SEAL FLOSEL BAXT -B: Performed by: UROLOGY

## 2019-10-17 PROCEDURE — 74011000250 HC RX REV CODE- 250

## 2019-10-17 PROCEDURE — 77030020253 HC SOL INJ D545NS .05 DEX .45 SAL

## 2019-10-17 PROCEDURE — 77030034154 HC SHR COAG HARM ACE J&J -F: Performed by: UROLOGY

## 2019-10-17 PROCEDURE — 74011000250 HC RX REV CODE- 250: Performed by: NURSE ANESTHETIST, CERTIFIED REGISTERED

## 2019-10-17 PROCEDURE — 0TB04ZZ EXCISION OF RIGHT KIDNEY, PERCUTANEOUS ENDOSCOPIC APPROACH: ICD-10-PCS | Performed by: UROLOGY

## 2019-10-17 PROCEDURE — 65270000029 HC RM PRIVATE

## 2019-10-17 PROCEDURE — 74011250637 HC RX REV CODE- 250/637: Performed by: UROLOGY

## 2019-10-17 PROCEDURE — 77030018836 HC SOL IRR NACL ICUM -A: Performed by: UROLOGY

## 2019-10-17 PROCEDURE — 77030019940 HC BLNKT HYPOTHRM STRY -B: Performed by: ANESTHESIOLOGY

## 2019-10-17 PROCEDURE — 77030008756 HC TU IRR SUC STRY -B: Performed by: UROLOGY

## 2019-10-17 PROCEDURE — 77030019908 HC STETH ESOPH SIMS -A: Performed by: ANESTHESIOLOGY

## 2019-10-17 PROCEDURE — 77030009527 HC GEL PRT SYS AMR -E: Performed by: UROLOGY

## 2019-10-17 PROCEDURE — 77030034850: Performed by: UROLOGY

## 2019-10-17 PROCEDURE — 77030002966 HC SUT PDS J&J -A: Performed by: UROLOGY

## 2019-10-17 PROCEDURE — 77030011450 HC HNDPC AR BM COVD -B: Performed by: UROLOGY

## 2019-10-17 PROCEDURE — 77030008522 HC TBNG INSUF LAPRO STRY -B: Performed by: UROLOGY

## 2019-10-17 PROCEDURE — 77030018673: Performed by: UROLOGY

## 2019-10-17 PROCEDURE — 77030000038 HC TIP SCIS LAPSCP SURI -B: Performed by: UROLOGY

## 2019-10-17 PROCEDURE — 77030037088 HC TUBE ENDOTRACH ORAL NSL COVD-A: Performed by: ANESTHESIOLOGY

## 2019-10-17 PROCEDURE — 77030040506 HC DRN WND MDII -A: Performed by: UROLOGY

## 2019-10-17 PROCEDURE — 85018 HEMOGLOBIN: CPT

## 2019-10-17 PROCEDURE — 74011250637 HC RX REV CODE- 250/637: Performed by: ANESTHESIOLOGY

## 2019-10-17 RX ORDER — LEVETIRACETAM 500 MG/1
1500 TABLET ORAL 2 TIMES DAILY
Status: DISCONTINUED | OUTPATIENT
Start: 2019-10-17 | End: 2019-10-20 | Stop reason: HOSPADM

## 2019-10-17 RX ORDER — ONDANSETRON 2 MG/ML
4 INJECTION INTRAMUSCULAR; INTRAVENOUS
Status: DISCONTINUED | OUTPATIENT
Start: 2019-10-17 | End: 2019-10-20 | Stop reason: HOSPADM

## 2019-10-17 RX ORDER — PROPOFOL 10 MG/ML
INJECTION, EMULSION INTRAVENOUS AS NEEDED
Status: DISCONTINUED | OUTPATIENT
Start: 2019-10-17 | End: 2019-10-17 | Stop reason: HOSPADM

## 2019-10-17 RX ORDER — ONDANSETRON 2 MG/ML
INJECTION INTRAMUSCULAR; INTRAVENOUS AS NEEDED
Status: DISCONTINUED | OUTPATIENT
Start: 2019-10-17 | End: 2019-10-17 | Stop reason: HOSPADM

## 2019-10-17 RX ORDER — FAMOTIDINE 20 MG/1
20 TABLET, FILM COATED ORAL ONCE
Status: DISCONTINUED | OUTPATIENT
Start: 2019-10-17 | End: 2019-10-17 | Stop reason: HOSPADM

## 2019-10-17 RX ORDER — SODIUM CHLORIDE 0.9 % (FLUSH) 0.9 %
5-40 SYRINGE (ML) INJECTION AS NEEDED
Status: DISCONTINUED | OUTPATIENT
Start: 2019-10-17 | End: 2019-10-17 | Stop reason: HOSPADM

## 2019-10-17 RX ORDER — MIDAZOLAM HYDROCHLORIDE 1 MG/ML
2 INJECTION, SOLUTION INTRAMUSCULAR; INTRAVENOUS ONCE
Status: DISCONTINUED | OUTPATIENT
Start: 2019-10-17 | End: 2019-10-17 | Stop reason: HOSPADM

## 2019-10-17 RX ORDER — ACETAMINOPHEN 325 MG/1
650 TABLET ORAL
Status: DISCONTINUED | OUTPATIENT
Start: 2019-10-17 | End: 2019-10-20 | Stop reason: HOSPADM

## 2019-10-17 RX ORDER — FAMOTIDINE 20 MG/1
20 TABLET, FILM COATED ORAL 2 TIMES DAILY
Status: DISCONTINUED | OUTPATIENT
Start: 2019-10-17 | End: 2019-10-20 | Stop reason: HOSPADM

## 2019-10-17 RX ORDER — SODIUM CHLORIDE 0.9 % (FLUSH) 0.9 %
5-40 SYRINGE (ML) INJECTION EVERY 8 HOURS
Status: DISCONTINUED | OUTPATIENT
Start: 2019-10-17 | End: 2019-10-17 | Stop reason: HOSPADM

## 2019-10-17 RX ORDER — EPHEDRINE SULFATE 50 MG/ML
INJECTION, SOLUTION INTRAVENOUS AS NEEDED
Status: DISCONTINUED | OUTPATIENT
Start: 2019-10-17 | End: 2019-10-17 | Stop reason: HOSPADM

## 2019-10-17 RX ORDER — ASPIRIN 81 MG/1
243 TABLET ORAL DAILY
Status: DISCONTINUED | OUTPATIENT
Start: 2019-10-18 | End: 2019-10-20 | Stop reason: HOSPADM

## 2019-10-17 RX ORDER — SODIUM CHLORIDE, SODIUM LACTATE, POTASSIUM CHLORIDE, CALCIUM CHLORIDE 600; 310; 30; 20 MG/100ML; MG/100ML; MG/100ML; MG/100ML
75 INJECTION, SOLUTION INTRAVENOUS CONTINUOUS
Status: DISCONTINUED | OUTPATIENT
Start: 2019-10-17 | End: 2019-10-17 | Stop reason: HOSPADM

## 2019-10-17 RX ORDER — CEFAZOLIN SODIUM/WATER 2 G/20 ML
2 SYRINGE (ML) INTRAVENOUS ONCE
Status: COMPLETED | OUTPATIENT
Start: 2019-10-17 | End: 2019-10-17

## 2019-10-17 RX ORDER — MANNITOL 250 MG/ML
12.5 INJECTION, SOLUTION INTRAVENOUS ONCE
Status: DISCONTINUED | OUTPATIENT
Start: 2019-10-17 | End: 2019-10-17 | Stop reason: CLARIF

## 2019-10-17 RX ORDER — POTASSIUM CHLORIDE 20 MEQ/1
20 TABLET, EXTENDED RELEASE ORAL DAILY
Status: DISCONTINUED | OUTPATIENT
Start: 2019-10-18 | End: 2019-10-20 | Stop reason: HOSPADM

## 2019-10-17 RX ORDER — NALOXONE HYDROCHLORIDE 0.4 MG/ML
0.2 INJECTION, SOLUTION INTRAMUSCULAR; INTRAVENOUS; SUBCUTANEOUS AS NEEDED
Status: DISCONTINUED | OUTPATIENT
Start: 2019-10-17 | End: 2019-10-17 | Stop reason: HOSPADM

## 2019-10-17 RX ORDER — MIDAZOLAM HYDROCHLORIDE 1 MG/ML
3 INJECTION, SOLUTION INTRAMUSCULAR; INTRAVENOUS ONCE
Status: COMPLETED | OUTPATIENT
Start: 2019-10-17 | End: 2019-10-17

## 2019-10-17 RX ORDER — OXYCODONE AND ACETAMINOPHEN 5; 325 MG/1; MG/1
1 TABLET ORAL AS NEEDED
Status: DISCONTINUED | OUTPATIENT
Start: 2019-10-17 | End: 2019-10-17 | Stop reason: HOSPADM

## 2019-10-17 RX ORDER — BUPIVACAINE HYDROCHLORIDE 5 MG/ML
INJECTION, SOLUTION EPIDURAL; INTRACAUDAL AS NEEDED
Status: DISCONTINUED | OUTPATIENT
Start: 2019-10-17 | End: 2019-10-17 | Stop reason: HOSPADM

## 2019-10-17 RX ORDER — SODIUM CHLORIDE 9 MG/ML
50 INJECTION, SOLUTION INTRAVENOUS CONTINUOUS
Status: DISCONTINUED | OUTPATIENT
Start: 2019-10-17 | End: 2019-10-17 | Stop reason: HOSPADM

## 2019-10-17 RX ORDER — ZOLPIDEM TARTRATE 5 MG/1
5 TABLET ORAL
Status: DISCONTINUED | OUTPATIENT
Start: 2019-10-17 | End: 2019-10-20 | Stop reason: HOSPADM

## 2019-10-17 RX ORDER — OXYBUTYNIN CHLORIDE 5 MG/1
5 TABLET ORAL
Status: DISCONTINUED | OUTPATIENT
Start: 2019-10-17 | End: 2019-10-19

## 2019-10-17 RX ORDER — DIPHENHYDRAMINE HYDROCHLORIDE 50 MG/ML
12.5 INJECTION, SOLUTION INTRAMUSCULAR; INTRAVENOUS
Status: DISCONTINUED | OUTPATIENT
Start: 2019-10-17 | End: 2019-10-20 | Stop reason: HOSPADM

## 2019-10-17 RX ORDER — ATORVASTATIN CALCIUM 40 MG/1
80 TABLET, FILM COATED ORAL
Status: DISCONTINUED | OUTPATIENT
Start: 2019-10-17 | End: 2019-10-20 | Stop reason: HOSPADM

## 2019-10-17 RX ORDER — DIPHENHYDRAMINE HYDROCHLORIDE 50 MG/ML
12.5 INJECTION, SOLUTION INTRAMUSCULAR; INTRAVENOUS ONCE
Status: DISCONTINUED | OUTPATIENT
Start: 2019-10-17 | End: 2019-10-17 | Stop reason: HOSPADM

## 2019-10-17 RX ORDER — HYDROMORPHONE HYDROCHLORIDE 2 MG/ML
0.5 INJECTION, SOLUTION INTRAMUSCULAR; INTRAVENOUS; SUBCUTANEOUS
Status: DISCONTINUED | OUTPATIENT
Start: 2019-10-17 | End: 2019-10-17 | Stop reason: HOSPADM

## 2019-10-17 RX ORDER — ATROPA BELLADONNA AND OPIUM 16.2; 6 MG/1; MG/1
1 SUPPOSITORY RECTAL
Status: DISCONTINUED | OUTPATIENT
Start: 2019-10-17 | End: 2019-10-19

## 2019-10-17 RX ORDER — AMLODIPINE BESYLATE 10 MG/1
10 TABLET ORAL DAILY
Status: DISCONTINUED | OUTPATIENT
Start: 2019-10-18 | End: 2019-10-20 | Stop reason: HOSPADM

## 2019-10-17 RX ORDER — ATENOLOL 25 MG/1
100 TABLET ORAL DAILY
Status: DISCONTINUED | OUTPATIENT
Start: 2019-10-18 | End: 2019-10-20 | Stop reason: HOSPADM

## 2019-10-17 RX ORDER — DOCUSATE SODIUM 100 MG/1
100 CAPSULE, LIQUID FILLED ORAL 2 TIMES DAILY
Status: DISCONTINUED | OUTPATIENT
Start: 2019-10-17 | End: 2019-10-20 | Stop reason: HOSPADM

## 2019-10-17 RX ORDER — MANNITOL 20 G/100ML
12.5 INJECTION, SOLUTION INTRAVENOUS ONCE
Status: DISCONTINUED | OUTPATIENT
Start: 2019-10-17 | End: 2019-10-17

## 2019-10-17 RX ORDER — PHENYTOIN SODIUM 100 MG/1
300 CAPSULE, EXTENDED RELEASE ORAL DAILY
Status: DISCONTINUED | OUTPATIENT
Start: 2019-10-18 | End: 2019-10-20 | Stop reason: HOSPADM

## 2019-10-17 RX ORDER — DEXTROSE MONOHYDRATE AND SODIUM CHLORIDE 5; .45 G/100ML; G/100ML
125 INJECTION, SOLUTION INTRAVENOUS CONTINUOUS
Status: DISCONTINUED | OUTPATIENT
Start: 2019-10-17 | End: 2019-10-18

## 2019-10-17 RX ORDER — SODIUM CHLORIDE 9 MG/ML
250 INJECTION, SOLUTION INTRAVENOUS AS NEEDED
Status: DISCONTINUED | OUTPATIENT
Start: 2019-10-17 | End: 2019-10-17 | Stop reason: HOSPADM

## 2019-10-17 RX ORDER — HYDROCODONE BITARTRATE AND ACETAMINOPHEN 7.5; 325 MG/1; MG/1
1 TABLET ORAL
Status: DISCONTINUED | OUTPATIENT
Start: 2019-10-17 | End: 2019-10-20 | Stop reason: HOSPADM

## 2019-10-17 RX ORDER — ACETAMINOPHEN 10 MG/ML
INJECTION, SOLUTION INTRAVENOUS AS NEEDED
Status: DISCONTINUED | OUTPATIENT
Start: 2019-10-17 | End: 2019-10-17 | Stop reason: HOSPADM

## 2019-10-17 RX ORDER — FENTANYL CITRATE 50 UG/ML
INJECTION, SOLUTION INTRAMUSCULAR; INTRAVENOUS AS NEEDED
Status: DISCONTINUED | OUTPATIENT
Start: 2019-10-17 | End: 2019-10-17 | Stop reason: HOSPADM

## 2019-10-17 RX ORDER — OXYCODONE HYDROCHLORIDE 5 MG/1
5 TABLET ORAL
Status: DISCONTINUED | OUTPATIENT
Start: 2019-10-17 | End: 2019-10-17 | Stop reason: HOSPADM

## 2019-10-17 RX ORDER — DEXAMETHASONE SODIUM PHOSPHATE 4 MG/ML
INJECTION, SOLUTION INTRA-ARTICULAR; INTRALESIONAL; INTRAMUSCULAR; INTRAVENOUS; SOFT TISSUE AS NEEDED
Status: DISCONTINUED | OUTPATIENT
Start: 2019-10-17 | End: 2019-10-17 | Stop reason: HOSPADM

## 2019-10-17 RX ORDER — MANNITOL 250 MG/ML
INJECTION, SOLUTION INTRAVENOUS AS NEEDED
Status: DISCONTINUED | OUTPATIENT
Start: 2019-10-17 | End: 2019-10-17 | Stop reason: HOSPADM

## 2019-10-17 RX ORDER — SODIUM CHLORIDE, SODIUM LACTATE, POTASSIUM CHLORIDE, CALCIUM CHLORIDE 600; 310; 30; 20 MG/100ML; MG/100ML; MG/100ML; MG/100ML
100 INJECTION, SOLUTION INTRAVENOUS CONTINUOUS
Status: DISCONTINUED | OUTPATIENT
Start: 2019-10-17 | End: 2019-10-17 | Stop reason: HOSPADM

## 2019-10-17 RX ORDER — MIDAZOLAM HYDROCHLORIDE 1 MG/ML
2 INJECTION, SOLUTION INTRAMUSCULAR; INTRAVENOUS ONCE
Status: COMPLETED | OUTPATIENT
Start: 2019-10-17 | End: 2019-10-17

## 2019-10-17 RX ORDER — MORPHINE SULFATE 2 MG/ML
2 INJECTION, SOLUTION INTRAMUSCULAR; INTRAVENOUS
Status: DISCONTINUED | OUTPATIENT
Start: 2019-10-17 | End: 2019-10-20 | Stop reason: HOSPADM

## 2019-10-17 RX ORDER — ROCURONIUM BROMIDE 10 MG/ML
INJECTION, SOLUTION INTRAVENOUS AS NEEDED
Status: DISCONTINUED | OUTPATIENT
Start: 2019-10-17 | End: 2019-10-17 | Stop reason: HOSPADM

## 2019-10-17 RX ORDER — NEOSTIGMINE METHYLSULFATE 1 MG/ML
INJECTION INTRAVENOUS AS NEEDED
Status: DISCONTINUED | OUTPATIENT
Start: 2019-10-17 | End: 2019-10-17 | Stop reason: HOSPADM

## 2019-10-17 RX ORDER — FENTANYL CITRATE 50 UG/ML
25 INJECTION, SOLUTION INTRAMUSCULAR; INTRAVENOUS ONCE
Status: DISCONTINUED | OUTPATIENT
Start: 2019-10-17 | End: 2019-10-17 | Stop reason: HOSPADM

## 2019-10-17 RX ORDER — ALLOPURINOL 300 MG/1
300 TABLET ORAL DAILY
Status: DISCONTINUED | OUTPATIENT
Start: 2019-10-18 | End: 2019-10-20 | Stop reason: HOSPADM

## 2019-10-17 RX ORDER — LIDOCAINE HYDROCHLORIDE 20 MG/ML
INJECTION, SOLUTION EPIDURAL; INFILTRATION; INTRACAUDAL; PERINEURAL AS NEEDED
Status: DISCONTINUED | OUTPATIENT
Start: 2019-10-17 | End: 2019-10-17

## 2019-10-17 RX ORDER — MIDAZOLAM HYDROCHLORIDE 1 MG/ML
2 INJECTION, SOLUTION INTRAMUSCULAR; INTRAVENOUS
Status: DISCONTINUED | OUTPATIENT
Start: 2019-10-17 | End: 2019-10-17 | Stop reason: HOSPADM

## 2019-10-17 RX ORDER — SPIRONOLACTONE 25 MG/1
25 TABLET ORAL DAILY
Status: DISCONTINUED | OUTPATIENT
Start: 2019-10-18 | End: 2019-10-20 | Stop reason: HOSPADM

## 2019-10-17 RX ORDER — GLYCOPYRROLATE 0.2 MG/ML
INJECTION INTRAMUSCULAR; INTRAVENOUS AS NEEDED
Status: DISCONTINUED | OUTPATIENT
Start: 2019-10-17 | End: 2019-10-17 | Stop reason: HOSPADM

## 2019-10-17 RX ORDER — LIDOCAINE HYDROCHLORIDE 10 MG/ML
0.1 INJECTION INFILTRATION; PERINEURAL AS NEEDED
Status: DISCONTINUED | OUTPATIENT
Start: 2019-10-17 | End: 2019-10-17 | Stop reason: HOSPADM

## 2019-10-17 RX ADMIN — HYDROCODONE BITARTRATE AND ACETAMINOPHEN 1 TABLET: 7.5; 325 TABLET ORAL at 17:12

## 2019-10-17 RX ADMIN — DEXAMETHASONE SODIUM PHOSPHATE 4 MG: 4 INJECTION, SOLUTION INTRA-ARTICULAR; INTRALESIONAL; INTRAMUSCULAR; INTRAVENOUS; SOFT TISSUE at 11:25

## 2019-10-17 RX ADMIN — Medication 2 G: at 11:19

## 2019-10-17 RX ADMIN — HYDROCODONE BITARTRATE AND ACETAMINOPHEN 1 TABLET: 7.5; 325 TABLET ORAL at 23:37

## 2019-10-17 RX ADMIN — SODIUM CHLORIDE, SODIUM LACTATE, POTASSIUM CHLORIDE, AND CALCIUM CHLORIDE 100 ML/HR: 600; 310; 30; 20 INJECTION, SOLUTION INTRAVENOUS at 09:55

## 2019-10-17 RX ADMIN — ROCURONIUM BROMIDE 10 MG: 10 INJECTION, SOLUTION INTRAVENOUS at 11:43

## 2019-10-17 RX ADMIN — ROCURONIUM BROMIDE 10 MG: 10 INJECTION, SOLUTION INTRAVENOUS at 11:19

## 2019-10-17 RX ADMIN — ROCURONIUM BROMIDE 15 MG: 10 INJECTION, SOLUTION INTRAVENOUS at 11:41

## 2019-10-17 RX ADMIN — MIDAZOLAM 2 MG: 1 INJECTION INTRAMUSCULAR; INTRAVENOUS at 10:16

## 2019-10-17 RX ADMIN — NEOSTIGMINE METHYLSULFATE 3 MG: 1 INJECTION INTRAVENOUS at 12:51

## 2019-10-17 RX ADMIN — MANNITOL 12.5 G: 250 INJECTION, SOLUTION INTRAVENOUS at 12:07

## 2019-10-17 RX ADMIN — MIDAZOLAM 3 MG: 1 INJECTION INTRAMUSCULAR; INTRAVENOUS at 10:28

## 2019-10-17 RX ADMIN — FENTANYL CITRATE 50 MCG: 50 INJECTION, SOLUTION INTRAMUSCULAR; INTRAVENOUS at 10:57

## 2019-10-17 RX ADMIN — MORPHINE SULFATE 2 MG: 2 INJECTION, SOLUTION INTRAMUSCULAR; INTRAVENOUS at 15:24

## 2019-10-17 RX ADMIN — OXYCODONE HYDROCHLORIDE AND ACETAMINOPHEN 1 TABLET: 5; 325 TABLET ORAL at 13:43

## 2019-10-17 RX ADMIN — DOCUSATE SODIUM 100 MG: 100 CAPSULE, LIQUID FILLED ORAL at 17:12

## 2019-10-17 RX ADMIN — ROCURONIUM BROMIDE 10 MG: 10 INJECTION, SOLUTION INTRAVENOUS at 11:28

## 2019-10-17 RX ADMIN — DEXTROSE MONOHYDRATE AND SODIUM CHLORIDE 125 ML/HR: 5; .45 INJECTION, SOLUTION INTRAVENOUS at 23:37

## 2019-10-17 RX ADMIN — DEXTROSE MONOHYDRATE AND SODIUM CHLORIDE 125 ML/HR: 5; .45 INJECTION, SOLUTION INTRAVENOUS at 15:25

## 2019-10-17 RX ADMIN — SODIUM CHLORIDE 1000 MG: 900 INJECTION, SOLUTION INTRAVENOUS at 18:58

## 2019-10-17 RX ADMIN — FENTANYL CITRATE 25 MCG: 50 INJECTION, SOLUTION INTRAMUSCULAR; INTRAVENOUS at 13:04

## 2019-10-17 RX ADMIN — LEVETIRACETAM 1500 MG: 500 TABLET ORAL at 17:12

## 2019-10-17 RX ADMIN — ROCURONIUM BROMIDE 50 MG: 10 INJECTION, SOLUTION INTRAVENOUS at 11:02

## 2019-10-17 RX ADMIN — SODIUM CHLORIDE, SODIUM LACTATE, POTASSIUM CHLORIDE, AND CALCIUM CHLORIDE: 600; 310; 30; 20 INJECTION, SOLUTION INTRAVENOUS at 12:01

## 2019-10-17 RX ADMIN — SODIUM CHLORIDE, SODIUM LACTATE, POTASSIUM CHLORIDE, AND CALCIUM CHLORIDE: 600; 310; 30; 20 INJECTION, SOLUTION INTRAVENOUS at 10:58

## 2019-10-17 RX ADMIN — FENTANYL CITRATE 25 MCG: 50 INJECTION, SOLUTION INTRAMUSCULAR; INTRAVENOUS at 12:28

## 2019-10-17 RX ADMIN — ATORVASTATIN CALCIUM 80 MG: 40 TABLET, FILM COATED ORAL at 21:00

## 2019-10-17 RX ADMIN — FAMOTIDINE 20 MG: 20 TABLET, FILM COATED ORAL at 17:12

## 2019-10-17 RX ADMIN — EPHEDRINE SULFATE 10 MG: 50 INJECTION, SOLUTION INTRAVENOUS at 11:48

## 2019-10-17 RX ADMIN — ONDANSETRON 4 MG: 2 INJECTION INTRAMUSCULAR; INTRAVENOUS at 12:44

## 2019-10-17 RX ADMIN — PROPOFOL 200 MG: 10 INJECTION, EMULSION INTRAVENOUS at 11:02

## 2019-10-17 RX ADMIN — ACETAMINOPHEN 1000 MG: 10 INJECTION, SOLUTION INTRAVENOUS at 13:07

## 2019-10-17 RX ADMIN — EPHEDRINE SULFATE 10 MG: 50 INJECTION, SOLUTION INTRAVENOUS at 11:31

## 2019-10-17 RX ADMIN — GLYCOPYRROLATE 0.4 MG: 0.2 INJECTION INTRAMUSCULAR; INTRAVENOUS at 12:51

## 2019-10-17 RX ADMIN — GLYCOPYRROLATE 0.2 MG: 0.2 INJECTION INTRAMUSCULAR; INTRAVENOUS at 11:32

## 2019-10-17 NOTE — ANESTHESIA POSTPROCEDURE EVALUATION
Procedure(s):  RIGHT PARTIAL NEPHRECTOMY LAPAROSCOPIC HAND ASSISTED.     general    Anesthesia Post Evaluation      Multimodal analgesia: multimodal analgesia used between 6 hours prior to anesthesia start to PACU discharge  Patient location during evaluation: PACU  Patient participation: complete - patient participated  Level of consciousness: awake and alert  Pain management: adequate  Airway patency: patent  Anesthetic complications: no  Cardiovascular status: acceptable  Respiratory status: acceptable  Hydration status: acceptable  Post anesthesia nausea and vomiting:  none      Vitals Value Taken Time   /86 10/17/2019  2:00 PM   Temp 36.5 °C (97.7 °F) 10/17/2019  2:00 PM   Pulse 49 10/17/2019  2:00 PM   Resp 14 10/17/2019  2:00 PM   SpO2 93 % 10/17/2019  2:00 PM

## 2019-10-17 NOTE — OP NOTES
[facility]  OPERATIVE REPORT    Name:  Nita Jonas  MR#:  [id number]  :  1950  ACCOUNT #:  [de-identified]  DATE OF SERVICE:  10/17/2019    PREOPERATIVE DIAGNOSIS:  Right renal mass. POSTOPERATIVE DIAGNOSIS:  Right renal mass. PROCEDURE PERFORMED:  Right hand-assisted laparoscopic partial nephrectomy and excision of right renal cyst.    SURGEON:  Jes Garza DO    ASSISTANT:  Sorin Berger MD    ANESTHESIA:  GETA. COMPLICATIONS:  None immediate. SPECIMENS REMOVED:  Right renal cyst and right renal mass. IMPLANTS:  None. ESTIMATED BLOOD LOSS:  25 mL. CLINICAL HISTORY:  This is a 75-year-old gentleman who I have been following for a history of an incidental right lower pole renal mass. Recent CT scan on 2019 showed an enhancing 2.2 cm right lower pole mass as well as a smaller left posterior renal mass with borderline enhancement. We reviewed all treatment options and he has elected to proceed with the above-mentioned procedure. All risks, benefits and alternatives to the procedure have been discussed and he is willing to proceed at this time. DESCRIPTION OF PROCEDURE:  Patient consent was obtained. The patient was brought back to the operating room at which time he was placed in the supine position. After the uneventful induction of general anesthesia, a Nolasco catheter was placed to dependent drainage. The patient was then positioned in the right lateral decubitus position. All pressure points were carefully padded. The patient was taped to the table. The patient's abdominal area was prepped and draped and a sterile field applied. A small right lower quadrant oblique incision was made. This was carried down to the peritoneal cavity without event. The abdomen was then insufflated with CO2.   Ports were then placed in a standard partial nephrectomy fashion with two 12 mm ports placed in the right upper quadrant as well as a 5-mm subxiphoid port.  Using the harmonic scalpel the white line of Toldt was incised and the colon was reflected medially. The kidney was identified. The duodenum was identified and kocherized. The lower pole of the kidney was then dissected out. The ureter was identified and preserved. This was used as a handle to aid in proximal dissection. Dissection then carried proximally towards the renal hilum. A separate renal artery and vein were identified and dissected out. The lateral and posterior attachments of the kidney were taken down in similar fashion using the harmonic scalpel. The lower pole of the kidney was defatted. The mass was identified. Laparoscopic ultrasound was then performed. This revealed an approximately 3 cm solid appearing mass that was primarily exophytic. A larger renal cyst was just lateral to the mass. I elected to aspirate the renal cyst.  Approximately 200 mL of straw-colored fluid was drained. The exophytic portion of the cyst was then excised using the harmonic scalpel and sent off as specimen. The edges of the cysts were then fulgurated using the argon beam coagulator. Following hemostasis, 12.5 g of mannitol was given intravenously. The renal artery was then clamped using a laparoscopic bulldog clamp followed by the renal vein. The lower pole mass was excised using laparoscopic scissors. The defect was then fulgurated using the argon beam coagulator. A small Surgicel bolster was then placed in the defect and the renal parenchymal edges were reapproximated using 0 Vicryl suture in a simple interrupted fashion. The laparoscopic bulldog clamp was then removed from the renal vein followed by the renal artery. Total warm ischemia time was 12 minutes. No significant bleeding was seen from the renal defect. The intra-abdominal pressures were lowered. No active bleeding was seen at the renal defect or in the right abdominal cavity.   I did elect to place 5 mL of FloSeal over the renal defect followed by small pieces of Nu-Knit Surgicel. The kidney was then returned to its normal orthotopic position. Intra-abdominal pressures were raised. The two 12-mm ports were then placed using 0 Vicryl on Endoclose device under direct vision. A #15 round AZEEM drain was placed through the 5-mm port site and sewn in place using 2-0 silk suture. All ports were then removed under direct vision. The GelPort incision was closed in two layers. The first layer incorporated the peritoneum and transversalis fascia layers. This was closed using 0 Vicryl in a running fashion. The second layer incorporated the internal and external oblique fascial layers. This was closed using #1 PDS in a running fashion. All skin incisions were then closed using skin staples. The patient tolerated the procedure well. Estimated blood loss was less than 25 mL.         LILA ELIAS DO      [id number]  D:  10/17/2019 13:21  T:  10/17/2019 19:24  JOB #:  [id number]

## 2019-10-17 NOTE — ANESTHESIA PREPROCEDURE EVALUATION
Relevant Problems   No relevant active problems       Anesthetic History               Review of Systems / Medical History  Patient summary reviewed and pertinent labs reviewed    Pulmonary        Sleep apnea  Undiagnosed apnea         Neuro/Psych     seizures  CVA: no residual symptoms      Comments: H/O cervical stenosis    Seizures after CVA in 2015 Cardiovascular    Hypertension: well controlled          Hyperlipidemia    Exercise tolerance: >4 METS     GI/Hepatic/Renal     GERD: well controlled    Renal disease  PUD    Comments: Right renal mass Endo/Other        Arthritis     Other Findings   Comments: Gouty arthritis         Physical Exam    Airway  Mallampati: II  TM Distance: > 6 cm  Neck ROM: normal range of motion   Mouth opening: Normal     Cardiovascular  Regular rate and rhythm,  S1 and S2 normal,  no murmur, click, rub, or gallop  Rhythm: regular  Rate: normal         Dental    Dentition: Full upper dentures     Pulmonary  Breath sounds clear to auscultation               Abdominal         Other Findings            Anesthetic Plan    ASA: 3  Anesthesia type: general            Anesthetic plan and risks discussed with: Patient

## 2019-10-17 NOTE — OP NOTES
[address] [facility]  OPERATIVE REPORT    Name:  Liz Marroquin  MR#:  [id number]  :  1950  ACCOUNT #:  [de-identified]  DATE OF SERVICE:  10/17/2019    PREOPERATIVE DIAGNOSIS:  Right renal mass. POSTOPERATIVE DIAGNOSES:  Right renal mass, right renal cyst.    PROCEDURE PERFORMED:  Right laparoscopic hand-assisted partial nephrectomy and right laparoscopic cyst decortication. SURGEON:  Baudilio Swanson DO    ASSISTANT:  Kathy Brumfield MD    ANESTHESIA:  General.    COMPLICATIONS:  None. SPECIMENS REMOVED:  Right renal cyst wall, right renal mass. IMPLANTS:  AZEEM drain. ESTIMATED BLOOD LOSS:  10 mL    FINDINGS:  Exophytic 2-cm right lower pole renal mass, one artery, one vein, and a large right lower pole renal cyst abutting the mass. The cyst was decorticated and the mass upon resection had grossly negative margins. INDICATIONS FOR OPERATIVE PROCEDURE:  The patient is a 70-year-old gentleman with a 2-cm right lower pole renal mass that enhances and is concerning for malignancy as well as multiple simple renal cysts. He opted for right hand-assisted laparoscopic partial nephrectomy by my partner, Dr. Lois Morgan, today. I performed as the assistant throughout the case. DESCRIPTION OF OPERATIVE PROCEDURE:  After informed consent was obtained, the correct patient was identified in the preoperative holding area. He was taken back to the operating room suite and placed on the table in the supine position. Time-out was performed confirming the correct patient and planned procedure. He received 3 grams of IV Ancef prior to the smooth induction of general endotracheal anesthesia. He was then turned into the left lateral decubitus position and prepped and draped in the usual sterile fashion. Nolasco catheter was placed using sterile technique. Please see Dr. Gayla Beltran operative note for further details regarding this case.   I was present and scrubbed and performed as the assistant in all portions of the case from opening through closing. I assisted Dr. Sheyla Chambers in laparoscopic port placement, insufflation of the abdomen, reflection of the colon, dissection of the kidney, removal of the mass, and closure. Again, please see Dr. Jp De Luna operative note for further details regarding this operative procedure.       MD YAS Guerrero/[id number]  D:  10/17/2019 12:58  T:  10/17/2019 14:35  JOB #:  [id number]

## 2019-10-17 NOTE — BRIEF OP NOTE
BRIEF OPERATIVE NOTE    Date of Procedure: 10/17/2019   Preoperative Diagnosis: Right renal mass [N28.89]  Postoperative Diagnosis: Right renal mass [N28.89]    Procedure(s):  RIGHT PARTIAL NEPHRECTOMY LAPAROSCOPIC HAND ASSISTED & EXCISION OF RENAL CYST  Surgeon(s) and Role:     * Alonso Hawk,  - Primary     * Alphonso Villa MD - Assisting         Surgical Assistant:  Italo    Surgical Staff:  Circ-1: Josiah Tiwari  Circ-Relief: Nikita Swanson, RN  Scrub Tech-1: Pola Mcguire  Event Time In Time Out   Incision Start 1121    Incision Close 1246      Anesthesia: General   Estimated Blood Loss: 25cc  Specimens:   ID Type Source Tests Collected by Time Destination   1 : Right Renal Cyst Wall Fresh Other                  Vernell Barnhart DO 10/17/2019 1212 Pathology   2 : Right Renal Mass Fresh Other                  Vernell Barnhart DO 10/17/2019 1216 Pathology      Findings: see op note  Complications: none immediate  Implants: * No implants in log *

## 2019-10-17 NOTE — PERIOP NOTES
TRANSFER - OUT REPORT:    Verbal report given to [facility] RN on Fanny i[facility]s  being transferred to  for routine post - op       Report consisted of patients Situation, Background, Assessment and   Recommendations(SBAR). Information from the following report(s) OR Summary, Procedure Summary, Intake/Output, MAR, Recent Results and Cardiac Rhythm SB was reviewed with the receiving nurse. Lines:   Double Lumen 10/17/19 Right (Active)   Central Line Being Utilized Yes 10/17/2019  1:10 PM   Criteria for Appropriate Use Limited/no vessel suitable for conventional peripheral access 10/17/2019  1:10 PM   Site Assessment Clean, dry, & intact 10/17/2019  1:10 PM   Infiltration Assessment 0 10/17/2019  1:10 PM   Affected Extremity/Extremities Color distal to insertion site pink (or appropriate for race); Pulses palpable;Range of motion performed 10/17/2019  1:10 PM   Date of Last Dressing Change 10/17/19 10/17/2019  1:10 PM   Dressing Status Clean, dry, & intact 10/17/2019  1:10 PM   Dressing Type Disk with Chlorhexadine gluconate (CHG); Tape;Transparent 10/17/2019  1:10 PM        Opportunity for questions and clarification was provided. Patient transported with:   Tech    VTE prophylaxis orders have been written for Fanny Pitts. Patient and family given floor number and nurses name. Family updated re: pt status after security code verified.

## 2019-10-17 NOTE — PROGRESS NOTES
TRANSFER - IN REPORT:    Verbal report received from An Pérez RN(name) on [facility] [employer]  being received from [employer]) for routine post - op      Report consisted of patients Situation, Background, Assessment and   Recommendations(SBAR). Information from the following report(s) SBAR was reviewed with the receiving nurse. Opportunity for questions and clarification was provided. Assessment completed upon patients arrival to unit and care assumed.

## 2019-10-17 NOTE — ANESTHESIA PROCEDURE NOTES
Central Line Placement    Start time: 10/17/2019 10:38 AM  End time: 10/17/2019 10:52 AM  Performed by: Maame Luna MD  Authorized by: Maame Luna MD     Indications: vascular access  Preanesthetic Checklist: patient identified, risks and benefits discussed, anesthesia consent, site marked and patient being monitored    Timeout Time: 10:38       Pre-procedure: All elements of maximal sterile barrier technique followed? Yes    2% Chlorhexidine for cutaneous antisepsis, Hand hygiene performed prior to catheter insertion and Ultrasound guidance    Sterile Ultrasound Technique followed?: Yes        Ultrasound Image Stored? Image stored    Procedure:   Prep:  Chlorhexidine    Orientation:  Right  Patient position:  Trendelenburg  Catheter type:  Double lumen  Catheter size:  8 Fr  Caudal catheter size: 20 cm cath passed to 14 cm at skin.   Number of attempts:  1  Successful placement: Yes      Assessment:   Post-procedure:  Catheter secured, sterile dressing applied and sterile dressing with CHG applied  Assessment:  Blood return through all ports, free fluid flow and guidewire removal verified  Insertion:  Uncomplicated  Patient tolerance:  Patient tolerated the procedure well with no immediate complications

## 2019-10-17 NOTE — H&P
Caroline Huang  : 1950     HPI   71 y.o., male returns in follow up for gross hematuria and bilateral renal masses.  CT at [facility] on 18 showed a 2.2cm RLP mass with slight enhancement and a 15mm LMP mass with slight enhancement. . These lesions measured 21mm and 12mm respectively on 17 CT.  Also noted was a perirectal soft tissue nodule which his PCP is aware of per his report.  Abd ultrasound on 19 shows an enlarging L renal mass now measuring 2.5cm.  Repeat CT on 19 shows similar findings with an ind 1.5cm L posterior renal mass with borderline enhancement and a 2.2cm RLP enhancing renal mass adjacent to a renal cyst.  Cysto on 18 showed raised and injected urothelium on the PW.  Biopsies finally completed on 19 which showed cystitis.  Diagnosed with two recent E coli UTI's per report.  Previously followed for testicular cysts.  Previous ELMER on 13 showed multiple tunica albuginea cysts on the R.  Repeat ELMER on 17 showed bilateral intra and extratesticular cysts without concerning masses.  Previous cyst excision in .  Reports nocturia 0-3x per night.  PSA was 1.8 on 19.   Cr was 0.9 on 19.             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    Cervical stenosis of spinal canal 2009    Chronic cystitis 2019    Constipation      Diverticulosis of colon      Dysarthria      Genital herpes      GERD (gastroesophageal reflux disease)       well controlled with medication    Gout, unspecified      Headache      Hypercholesteremia      Hypertension       managed with medications    Impotence      Nevus, non-neoplastic      Observed sleep apnea       patients wife states he stops breathing and pt is working on getting a sleep study    Other specified disorder of male genital organs(608.89)      PUD (peptic ulcer disease)       gastric ulcer bleeding     Seizures ([facility] [place] [address]       seizures started after stroke in 2015    Stroke [facility]) 2015    Testicular mass              Past Surgical History:   Procedure Laterality Date    HX CERVICAL FUSION   12/2009    HX COLONOSCOPY        HX UROLOGICAL         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        losartan (COZAAR) 100 mg tablet Take 1 Tab by mouth daily. 90 Tab 1    allopurinol (ZYLOPRIM) 300 mg tablet TAKE ONE TABLET BY MOUTH ONE TIME DAILY 90 Tab 1    amLODIPine (NORVASC) 10 mg tablet Take 1 Tab by mouth daily. 90 Tab 1    atenolol (TENORMIN) 100 mg tablet Take 1 Tab by mouth daily. 90 Tab 1    atorvastatin (LIPITOR) 80 mg tablet Take 1 Tab by mouth daily. 90 Tab 1    chlorthalidone (HYGROTEN) 25 mg tablet Take 1 Tab by mouth daily. 90 Tab 1    potassium chloride (K-DUR, KLOR-CON) 20 mEq tablet Take 1 Tab by mouth daily. 90 Tab 1    spironolactone (ALDACTONE) 25 mg tablet TAKE ONE TABLET BY MOUTH ONE TIME DAILY 90 Tab 1    DILANTIN 30 mg ER capsule TAKE TWO CAPSULES BY MOUTH ONE TIME DAILY   0    phenytoin (PHENYTEK) 300 mg ER capsule Take 300 mg by mouth daily.        levETIRAcetam (KEPPRA) 750 mg tablet Take 1,500 mg by mouth two (2) times a day.        aspirin delayed-release 81 mg tablet Take 243 mg by mouth daily. Per Becca Coronel at [facility] office, pt to cut back to 81mg daily until after surgery. Last dose of 243mg 7/11/19        DOCUSATE CALCIUM (STOOL SOFTENER PO) Take 2 Tabs by mouth as needed.        ranitidine (ZANTAC) 150 mg tablet 150 mg two (2) times a day.                Allergies   Allergen Reactions   [facility] Melisa Remy Medoxomil] Other (comments)       Other reaction(s): Other (comments)  Dizziness, loss of appetite. Dizziness, loss of appetite.     Viagra [Sildenafil] Unknown (comments)    Zonegran [Zonisamide] Other (comments)       confusion      Social History            Socioeconomic History    Marital status:        Spouse name: Not on file    Number of children: Not on file    Years of education: Not on file    Highest education level: Not on file   Occupational History    Not on file   Social Needs    Financial resource strain: Not on file    Food insecurity:       Worry: Not on file       Inability: Not on file    Transportation needs:       Medical: Not on file       Non-medical: Not on file   Tobacco Use    Smoking status: Never Smoker    Smokeless tobacco: Never Used   Substance and Sexual Activity    Alcohol use: No    Drug use: No    Sexual activity: Never   Lifestyle    Physical activity:       Days per week: Not on file       Minutes per session: Not on file    Stress: Not on file   Relationships    Social connections:       Talks on phone: Not on file       Gets together: Not on file       Attends [employer] service: Not on file       Active member of club or organization: Not on file       Attends meetings of clubs or organizations: Not on file       Relationship status: Not on file    Intimate partner violence:       Fear of current or ex partner: Not on file       Emotionally abused: Not on file       Physically abused: Not on file       Forced sexual activity: Not on file   Other Topics Concern    Not on file   Social History Narrative    Not on file            Family History   Problem Relation Age of Onset    Hypertension Mother      Stroke Mother      Heart Disease Mother      Hypertension Father      Hypertension Sister      Hypertension Brother      Diabetes Brother      Hypertension Sister      Hypertension Other           gen fam hx         Review of Systems  All systems reviewed and are negative at this time.     Physical Exam  Visit Vitals  /79 (BP 1 Location: Right arm, BP Patient Position: At rest)   Pulse (!) 56   Temp 97.9 °F (36.6 °C)   Resp 18   Wt 226 lb 12.8 oz (102.9 kg)   SpO2 99%   BMI 29.12 kg/m²     General appearance - alert, well appearing, and in no distress  Mental status - alert and oriented  Eyes - extraocular eye movements intact, sclera anicteric  Nose - normal and patent, no erythema or discharge  Mouth - mucous membranes moist  Chest - clear to auscultation bilaterally  Heart - normal rate, regular rhythm  Abdomen - soft, nontender, nondistended, no masses or organomegaly  Neurological - normal speech, no focal findings or movement disorder noted  Skin - normal coloration and turgor       Assessment/Plan      ICD-10-CM ICD-9-CM     1. Chronic cystitis with hematuria N30.21 595. 2         599.70     2. Bilateral renal masses N28.89 593.9 AMB POC URINALYSIS DIP STICK AUTO W/O MICRO (PGU)      I reviewed all tx options for his renal masses including cont surveillance, biopsy, ablation, partial nephrectomy and radical nephrectomy. We have elected to cont to monitor his L renal mass given its characteristics and small size. I do feel this would be amendale to perc cryo in future if needed. He has elected to proceed with a RIGHT HALPN. All risks, benefits and alternatives to the above mentioned procedure were discussed and the patient is willing to proceed at this time.   Risks reviewed including (but not limited to) bleeding, infection, adjacent organ injury, cancer recurrence, risk of benign lesion, urine leak, conversion to open, conversion to radical nephrectomy, ESRD, DVT, PE, MI, CVA and even death.     Diana Hawk DO

## 2019-10-17 NOTE — PROGRESS NOTES
10/17/19 1443   Dual Skin Pressure Injury Assessment   Dual Skin Pressure Injury Assessment X  (X4 incision sites to ABD w/ AZEEM)   Second Care Provider (Based on [address] [place] [address]) LINO Franklin   Skin Integumentary   Skin Integumentary (WDL) X   Skin Integrity Incision (comment)  (X4 incision sites to ABD w/ AZEEM)

## 2019-10-18 LAB
ANION GAP SERPL CALC-SCNC: 5 MMOL/L (ref 7–16)
BUN SERPL-MCNC: 11 MG/DL (ref 8–23)
CALCIUM SERPL-MCNC: 8.4 MG/DL (ref 8.3–10.4)
CHLORIDE SERPL-SCNC: 102 MMOL/L (ref 98–107)
CO2 SERPL-SCNC: 32 MMOL/L (ref 21–32)
CREAT SERPL-MCNC: 0.97 MG/DL (ref 0.8–1.5)
GLUCOSE SERPL-MCNC: 157 MG/DL (ref 65–100)
HCT VFR BLD AUTO: 32.1 % (ref 41.1–50.3)
HGB BLD-MCNC: 10.8 G/DL (ref 13.6–17.2)
POTASSIUM SERPL-SCNC: 3 MMOL/L (ref 3.5–5.1)
SODIUM SERPL-SCNC: 139 MMOL/L (ref 136–145)

## 2019-10-18 PROCEDURE — 74011250637 HC RX REV CODE- 250/637: Performed by: NURSE PRACTITIONER

## 2019-10-18 PROCEDURE — 85018 HEMOGLOBIN: CPT

## 2019-10-18 PROCEDURE — 77030020253 HC SOL INJ D545NS .05 DEX .45 SAL

## 2019-10-18 PROCEDURE — 74011250637 HC RX REV CODE- 250/637: Performed by: UROLOGY

## 2019-10-18 PROCEDURE — 74011000258 HC RX REV CODE- 258: Performed by: UROLOGY

## 2019-10-18 PROCEDURE — 97161 PT EVAL LOW COMPLEX 20 MIN: CPT

## 2019-10-18 PROCEDURE — 74011250636 HC RX REV CODE- 250/636: Performed by: NURSE PRACTITIONER

## 2019-10-18 PROCEDURE — 74011250636 HC RX REV CODE- 250/636: Performed by: UROLOGY

## 2019-10-18 PROCEDURE — 80048 BASIC METABOLIC PNL TOTAL CA: CPT

## 2019-10-18 PROCEDURE — 65270000029 HC RM PRIVATE

## 2019-10-18 RX ORDER — POTASSIUM CHLORIDE 20 MEQ/1
40 TABLET, EXTENDED RELEASE ORAL
Status: COMPLETED | OUTPATIENT
Start: 2019-10-18 | End: 2019-10-18

## 2019-10-18 RX ORDER — DEXTROSE, SODIUM CHLORIDE, AND POTASSIUM CHLORIDE 5; .45; .15 G/100ML; G/100ML; G/100ML
125 INJECTION INTRAVENOUS CONTINUOUS
Status: DISCONTINUED | OUTPATIENT
Start: 2019-10-18 | End: 2019-10-19

## 2019-10-18 RX ORDER — MAG HYDROX/ALUMINUM HYD/SIMETH 200-200-20
30 SUSPENSION, ORAL (FINAL DOSE FORM) ORAL
Status: DISCONTINUED | OUTPATIENT
Start: 2019-10-18 | End: 2019-10-20 | Stop reason: HOSPADM

## 2019-10-18 RX ADMIN — ALLOPURINOL 300 MG: 300 TABLET ORAL at 08:39

## 2019-10-18 RX ADMIN — HYDROCODONE BITARTRATE AND ACETAMINOPHEN 1 TABLET: 7.5; 325 TABLET ORAL at 08:44

## 2019-10-18 RX ADMIN — SPIRONOLACTONE 25 MG: 25 TABLET ORAL at 08:38

## 2019-10-18 RX ADMIN — FAMOTIDINE 20 MG: 20 TABLET, FILM COATED ORAL at 08:39

## 2019-10-18 RX ADMIN — ASPIRIN 243 MG: 81 TABLET, COATED ORAL at 08:35

## 2019-10-18 RX ADMIN — SODIUM CHLORIDE 1000 MG: 900 INJECTION, SOLUTION INTRAVENOUS at 11:05

## 2019-10-18 RX ADMIN — SODIUM CHLORIDE 1000 MG: 900 INJECTION, SOLUTION INTRAVENOUS at 03:11

## 2019-10-18 RX ADMIN — ATORVASTATIN CALCIUM 80 MG: 40 TABLET, FILM COATED ORAL at 21:19

## 2019-10-18 RX ADMIN — DOCUSATE SODIUM 100 MG: 100 CAPSULE, LIQUID FILLED ORAL at 08:39

## 2019-10-18 RX ADMIN — BENZOCAINE AND MENTHOL 1 LOZENGE: 15; 2.6 LOZENGE ORAL at 03:18

## 2019-10-18 RX ADMIN — ALUMINUM HYDROXIDE, MAGNESIUM HYDROXIDE, AND SIMETHICONE 30 ML: 200; 200; 20 SUSPENSION ORAL at 23:44

## 2019-10-18 RX ADMIN — FAMOTIDINE 20 MG: 20 TABLET, FILM COATED ORAL at 17:31

## 2019-10-18 RX ADMIN — LEVETIRACETAM 1500 MG: 500 TABLET ORAL at 08:37

## 2019-10-18 RX ADMIN — AMLODIPINE BESYLATE 10 MG: 10 TABLET ORAL at 08:38

## 2019-10-18 RX ADMIN — ALUMINUM HYDROXIDE, MAGNESIUM HYDROXIDE, AND SIMETHICONE 30 ML: 200; 200; 20 SUSPENSION ORAL at 13:40

## 2019-10-18 RX ADMIN — LEVETIRACETAM 1500 MG: 500 TABLET ORAL at 17:31

## 2019-10-18 RX ADMIN — POTASSIUM CHLORIDE 40 MEQ: 20 TABLET, EXTENDED RELEASE ORAL at 11:00

## 2019-10-18 RX ADMIN — DEXTROSE MONOHYDRATE, SODIUM CHLORIDE, AND POTASSIUM CHLORIDE 125 ML/HR: 50; 4.5; 1.49 INJECTION, SOLUTION INTRAVENOUS at 21:19

## 2019-10-18 RX ADMIN — DEXTROSE MONOHYDRATE, SODIUM CHLORIDE, AND POTASSIUM CHLORIDE 125 ML/HR: 50; 4.5; 1.49 INJECTION, SOLUTION INTRAVENOUS at 11:02

## 2019-10-18 RX ADMIN — PHENYTOIN SODIUM 300 MG: 100 CAPSULE ORAL at 08:37

## 2019-10-18 RX ADMIN — DOCUSATE SODIUM 100 MG: 100 CAPSULE, LIQUID FILLED ORAL at 17:31

## 2019-10-18 RX ADMIN — POTASSIUM CHLORIDE 20 MEQ: 20 TABLET, EXTENDED RELEASE ORAL at 08:39

## 2019-10-18 NOTE — PROGRESS NOTES
Admit Date: 10/17/2019    Subjective:     Kaila Soto is pleasant. Reports mild indigestion. Pain controlled. He has not passed flatus. Tolerating clear liquids. Nolasco catheter with clear UOP. AZEEM with SS OP (55 ml/12 hours). Objective:     Patient Vitals for the past 8 hrs:   BP Temp Pulse Resp SpO2   10/18/19 0711 135/82 99.2 °F (37.3 °C) (!) 57 18 97 %   10/18/19 0418 131/78 99.1 °F (37.3 °C) 64 18 97 %     10/18 0701 - 10/18 1900  In: 240 [P.O.:240]  Out: -   10/16 1901 - 10/18 0700  In: 1960 [P.O.:60; I.V.:1900]  Out: 0094 [Urine:2400; Drains:165]    Physical Exam:  GENERAL: alert, cooperative, no distress  LUNG: clear to auscultation bilaterally  HEART: regular rate and rhythm, S1, S2 ABDOMEN: soft, non-tender, no flatus, dressings c/d/i, AZEEM with SS OP  NEUROLOGIC: AOx3    Data Review   Recent Results (from the past 24 hour(s))   HGB & HCT    Collection Time: 10/17/19  1:33 PM   Result Value Ref Range    HGB 10.0 (L) 13.6 - 17.2 g/dL    HCT 30.6 (L) 41.1 - 19.2 %   METABOLIC PANEL, BASIC    Collection Time: 10/18/19  3:06 AM   Result Value Ref Range    Sodium 139 136 - 145 mmol/L    Potassium 3.0 (L) 3.5 - 5.1 mmol/L    Chloride 102 98 - 107 mmol/L    CO2 32 21 - 32 mmol/L    Anion gap 5 (L) 7 - 16 mmol/L    Glucose 157 (H) 65 - 100 mg/dL    BUN 11 8 - 23 MG/DL    Creatinine 0.97 0.8 - 1.5 MG/DL    GFR est AA >60 >60 ml/min/1.73m2    GFR est non-AA >60 >60 ml/min/1.73m2    Calcium 8.4 8.3 - 10.4 MG/DL   HGB & HCT    Collection Time: 10/18/19  3:06 AM   Result Value Ref Range    HGB 10.8 (L) 13.6 - 17.2 g/dL    HCT 32.1 (L) 41.1 - 50.3 %       Assessment:     Active Problems:    Renal mass (10/17/2019)      POD 1:    POSTOPERATIVE DIAGNOSIS:  Right renal mass.     PROCEDURE PERFORMED:  Right hand-assisted laparoscopic partial nephrectomy and excision of right renal cyst.    Cn-0.97  Hgb-10.8  K-3.0    VSS    Plan:     Continue clear liquids.   Advance diet once passing flatus (notify MD).    Ambulate with PT today. Continue IVF, add K. Add PO K x 1. Recheck BMP tomorrow AM.    Remove acuña catheter. Continue AZEEM drain. Mylanta PRN for indigestion. F/U appt being scheduled in 14 days for staple removal with NP. Brandee Vyas NP  [facility]    I have reviewed the above note and examined the patient. I agree with the exam, assessment and plan. Doing well. Denies flatus. Pain controlled. AF.  VSS. Abd soft. Dressings c/d/i.  AZEEM 110/55 (serosang). UOP stable and clear. Labs reviewed. S/P R HALPN POD#1. Remove Acuña. Watch drain output. Advance diet with flatus. Replace K.     Jero Hawk, DO

## 2019-10-18 NOTE — PROGRESS NOTES
Care Management Interventions  PCP Verified by CM: Yes  Last Visit to PCP: 03/20/19  Palliative Care Criteria Met (RRAT>21 & CHF Dx)?: No(RRAT 10 Dx Renal mass )  Mode of Transport at Discharge: Other (see comment)  Transition of Care Consult (CM Consult): Discharge Planning  Discharge Durable Medical Equipment: No(Cane)  Physical Therapy Consult: Yes  Occupational Therapy Consult: No  Speech Therapy Consult: No  Current Support Network: Lives with Spouse  Confirm Follow Up Transport: Family  Plan discussed with Pt/Family/Caregiver: Yes  Freedom of Choice Offered: Yes  Discharge Location  Discharge Placement: Home  Met with patient for d/c planning. Patient alert and oriented x 3, independent of ADL's and lives his wife in one story home with 1 step to entrance. DME includes cane as needed. He has transportation and able to drive. He has Medicare and [employer] and able to obtain medications at [facility] [phone]. He voices no concerns or needs for d/c. Current d/c plan is home when medically stable and pending progress. Noted PT consult. CM following.

## 2019-10-18 NOTE — PROGRESS NOTES
Hourly rounds completed this shift. Patient resting in bed. Pain controlled this shift. Will continue to monitor and give report to oncoming RN.

## 2019-10-18 NOTE — PROGRESS NOTES
offered active listening and a sense of connection and care with Pt during visit. Pt grateful for  stopping by. Pt is feeling better and hoping to go home sooner rather than later. No further needs from . Please Consult Spiritual Care as needed. Tala Pinzon.

## 2019-10-18 NOTE — PROGRESS NOTES
Problem: Mobility Impaired (Adult and Pediatric)  Goal: *Acute Goals and Plan of Care (Insert Text)  Description  LTG:  (1.)Mr. Roberto Carlos Smith will move from supine to sit and sit to supine, scoot up and down and roll side to side INDEPENDENTLY with bed flat within 7 treatment day(s). (2.)Mr. Roberto Carlos Smith will transfer from bed to chair and chair to bed with MODIFIED INDEPENDENCE using the least restrictive device within 7 treatment day(s). (3.)Mr. Roberto Carlos Smith will ambulate with MODIFIED INDEPENDENCE for 250+ feet with the least restrictive device within 7 treatment day(s). ________________________________________________________________________________________________   Outcome: Progressing Towards Goal     PHYSICAL THERAPY: Initial Assessment and PM 10/18/2019  INPATIENT:    [place] / Plan: [place] ONLY / Product Type: Medicare /       NAME/AGE/GENDER: Anthony London is a 71 y.o. male   PRIMARY DIAGNOSIS: Renal mass [N28.89]  Renal mass [N28.89] <principal problem not specified>   <principal problem not specified>    Procedure(s) (LRB):  RIGHT PARTIAL NEPHRECTOMY LAPAROSCOPIC HAND ASSISTED (Right)  1 Day Post-Op  ICD-10: Treatment Diagnosis:    Generalized Muscle Weakness (M62.81)  Difficulty in walking, Not elsewhere classified (R26.2)  Other abnormalities of gait and mobility (R26.89)   Precaution/Allergies:  Cinderella Brewer medoxomil]; Viagra [sildenafil]; and Zonegran [zonisamide]      ASSESSMENT:     Mr. Roberto Carlos Smith is a pleasant 71year old male seen for initial therapy assessment following above procedure. He is independent to mod I at baseline with cane, does have walker. He is limited today by post op pain, soreness, and some heartburn/indigestion which is his biggest complaint. Requires CGA-min assist for mobility and cueing/instruction on post op bed mobility and transfer techniques to decrease pain. Ambulates 100 ft in hallway with CGA and significantly forward flexed posture.  Up to chair after activity. Overall Mr. Donna Fu is demonstrating post op deficits with strength, mobility, and pain and will need continued therapy during hospital stay to address deficits/maximize independence. Dc needs TBD pending progress- hopefully home with [place] PT. This section established at most recent assessment   PROBLEM LIST (Impairments causing functional limitations):  Decreased Strength  Decreased Transfer Abilities  Decreased Ambulation Ability/Technique  Decreased Balance  Decreased Activity Tolerance   INTERVENTIONS PLANNED: (Benefits and precautions of physical therapy have been discussed with the patient.)  Balance Exercise  Bed Mobility  Gait Training  Home Exercise Program (HEP)  Therapeutic Activites  Therapeutic Exercise/Strengthening  Transfer Training     TREATMENT PLAN: Frequency/Duration: 3 times a week for duration of hospital stay  Rehabilitation Potential For Stated Goals: Good     REHAB RECOMMENDATIONS (at time of discharge pending progress):    Placement: It is my opinion, based on this patient's performance to date, that Mr. Donna Fu may benefit from [address] after discharge due to the functional deficits listed above that are likely to improve with skilled rehabilitation because he/she has multiple medical issues that affect his/her functional mobility in the community. Equipment:   None at this time              HISTORY:   History of Present Injury/Illness (Reason for Referral):  Per H&P, \"78 y.o., male returns in follow up for gross hematuria and bilateral renal masses. CT at [facility] on 4/23/18 showed a 2.2cm RLP mass with slight enhancement and a 15mm LMP mass with slight enhancement. . These lesions measured 21mm and 12mm respectively on 5/9/17 CT. Also noted was a perirectal soft tissue nodule which his PCP is aware of per his report. Abd ultrasound on 5/29/19 shows an enlarging L renal mass now measuring 2.5cm.   Repeat CT on 8/13/19 shows similar findings with an ind 1.5cm L posterior renal mass with borderline enhancement and a 2.2cm RLP enhancing renal mass adjacent to a renal cyst.  Cysto on 5/4/18 showed raised and injected urothelium on the PW. Biopsies finally completed on 7/18/19 which showed cystitis. Diagnosed with two recent E coli UTI's per report. Previously followed for testicular cysts. Previous ELMER on 12/12/13 showed multiple tunica albuginea cysts on the R. Repeat ELMER on 9/22/17 showed bilateral intra and extratesticular cysts without concerning masses. Previous cyst excision in 1985. Reports nocturia 0-3x per night. PSA was 1.8 on 5/8/19. Cr was 0.9 on 8/13/19\"  Past Medical History/Comorbidities:   Mr. Pito Baker  has a past medical history of Anemia, Blindness of right eye (1950), Cervical stenosis of spinal canal (12/16/2009), Chronic cystitis (07/16/2019), Constipation, Diverticulosis of colon, Dysarthria, Genital herpes, GERD (gastroesophageal reflux disease), Gout, unspecified, Headache, Hypercholesteremia, Hypertension, Impotence, Nevus, non-neoplastic, Observed sleep apnea, Other specified disorder of male genital organs(608.89), PUD (peptic ulcer disease), Seizures ([address]roke [address] [address] (2015), and Testicular mass. Mr. Pito Baker  has a past surgical history that includes hx colonoscopy; hx cervical fusion (12/2009); and hx urological.  Social History/Living Environment:   Home Environment: Private residence  # Steps to Enter: 0  Wheelchair Ramp: No  One/Two Story Residence: One story  Living Alone: No  Support Systems: Spouse/Significant Other/Partner, Family member(s)  Patient Expects to be Discharged to[de-identified] Private residence  Current DME Used/Available at Home: Kathy Renteria, straight, Walker, rolling  Prior Level of Function/Work/Activity:  Lives with wife. Occ cane use. Has walker.      Number of Personal Factors/Comorbidities that affect the Plan of Care: 1-2: MODERATE COMPLEXITY   EXAMINATION:   Most Recent Physical Functioning:   Gross Assessment:  AROM: Within functional limits  Strength: Generally decreased, functional  Coordination: Generally decreased, functional               Posture:  Posture (WDL): Exceptions to WDL  Posture Assessment: Forward head, Rounded shoulders, Trunk flexion  Balance:  Sitting: Intact  Standing: Impaired  Standing - Static: Fair  Standing - Dynamic : Fair Bed Mobility:  Rolling: Contact guard assistance;Minimum assistance  Supine to Sit: Minimum assistance  Scooting: Contact guard assistance  Wheelchair Mobility:     Transfers:  Sit to Stand: Contact guard assistance;Minimum assistance  Stand to Sit: Contact guard assistance  Bed to Chair: Contact guard assistance;Minimum assistance  Gait:     Base of Support: Widened;Center of gravity altered  Speed/Ree: Pace decreased (<100 feet/min); Slow  Step Length: Left shortened;Right shortened  Gait Abnormalities: Trunk sway increased;Decreased step clearance; Path deviations  Distance (ft): 100 Feet (ft)  Assistive Device: Walker, rolling  Ambulation - Level of Assistance: Contact guard assistance;Minimal assistance  Interventions: Verbal cues; Safety awareness training; Tactile cues      Body Structures Involved:  Muscles Body Functions Affected:  Sensory/Pain  Movement Related Activities and Participation Affected:  General Tasks and Demands  Mobility  Domestic Life  Community, Social and Civic Life   Number of elements that affect the Plan of Care: 4+: HIGH COMPLEXITY   CLINICAL PRESENTATION:   Presentation: Stable and uncomplicated: LOW COMPLEXITY   CLINICAL DECISION MAKIN [facility] Inpatient Short Form  How much difficulty does the patient currently have. .. Unable A Lot A Little None   1. Turning over in bed (including adjusting bedclothes, sheets and blankets)? ? 1   ? 2   ? 3   ? 4   2. Sitting down on and standing up from a chair with arms ( e.g., wheelchair, bedside commode, etc.)   ? 1   ? 2   ? 3   ? 4   3.   Moving from lying on back to sitting on the side of the bed?   ? 1   ? 2   ? 3   ? 4   How much help from another person does the patient currently need. .. Total A Lot A Little None   4. Moving to and from a bed to a chair (including a wheelchair)? ? 1   ? 2   ? 3   ? 4   5. Need to walk in hospital room? ? 1   ? 2   ? 3   ? 4   6. Climbing 3-5 steps with a railing? ? 1   ? 2   ? 3   ? 4   © 2007, Trustees of [address] [place] [address] [address] [id number], under license to [employer]. All rights reserved      Score:  Initial: 18 Most Recent: X (Date: -- )    Interpretation of Tool:  Represents activities that are increasingly more difficult (i.e. Bed mobility, Transfers, Gait). Medical Necessity:     Patient demonstrates good   rehab potential due to higher previous functional level. Reason for Services/Other Comments:  Patient continues to demonstrate capacity to improve strength, mobility, transfers, gait, activity tolerance which will increase independence and increase safety  . Use of outcome tool(s) and clinical judgement create a POC that gives a: Clear prediction of patient's progress: LOW COMPLEXITY            TREATMENT:   (In addition to Assessment/Re-Assessment sessions the following treatments were rendered)   Pre-treatment Symptoms/Complaints:  \"I'm so sore\"  Pain: Initial:   Pain Intensity 1: 6  Pain Location 1: Abdomen  Pain Intervention(s) 1: Nurse notified  Post Session:  0/10     Assessment/Reassessment only, no treatment provided today    Braces/Orthotics/Lines/Etc:   O2 Device: Room air  Treatment/Session Assessment:    Response to Treatment:  pt performs mobility with CGA-min A  Interdisciplinary Collaboration:   Physical Therapist  Registered Nurse  After treatment position/precautions:   Up in chair  Bed/Chair-wheels locked  Bed in low position  Call light within reach  Family at bedside   Compliance with Program/Exercises: Will assess as treatment progresses  Recommendations/Intent for next treatment session:   \"Next visit will focus on advancements to more challenging activities and reduction in assistance provided\".   Total Treatment Duration:  PT Patient Time In/Time Out  Time In: 1505  Time Out: [place] [address], DPT

## 2019-10-19 LAB
ANION GAP SERPL CALC-SCNC: 4 MMOL/L (ref 7–16)
BUN SERPL-MCNC: 6 MG/DL (ref 8–23)
CALCIUM SERPL-MCNC: 8.3 MG/DL (ref 8.3–10.4)
CHLORIDE SERPL-SCNC: 100 MMOL/L (ref 98–107)
CO2 SERPL-SCNC: 31 MMOL/L (ref 21–32)
CREAT SERPL-MCNC: 0.8 MG/DL (ref 0.8–1.5)
GLUCOSE SERPL-MCNC: 106 MG/DL (ref 65–100)
POTASSIUM SERPL-SCNC: 3.1 MMOL/L (ref 3.5–5.1)
SODIUM SERPL-SCNC: 135 MMOL/L (ref 136–145)

## 2019-10-19 PROCEDURE — 74011250636 HC RX REV CODE- 250/636: Performed by: NURSE PRACTITIONER

## 2019-10-19 PROCEDURE — 65270000029 HC RM PRIVATE

## 2019-10-19 PROCEDURE — 74011250637 HC RX REV CODE- 250/637: Performed by: UROLOGY

## 2019-10-19 PROCEDURE — 80048 BASIC METABOLIC PNL TOTAL CA: CPT

## 2019-10-19 RX ORDER — POLYETHYLENE GLYCOL 3350 17 G/17G
17 POWDER, FOR SOLUTION ORAL DAILY
Status: DISCONTINUED | OUTPATIENT
Start: 2019-10-19 | End: 2019-10-20 | Stop reason: HOSPADM

## 2019-10-19 RX ORDER — FACIAL-BODY WIPES
10 EACH TOPICAL DAILY
Status: DISCONTINUED | OUTPATIENT
Start: 2019-10-19 | End: 2019-10-20 | Stop reason: HOSPADM

## 2019-10-19 RX ADMIN — LEVETIRACETAM 1500 MG: 500 TABLET ORAL at 08:26

## 2019-10-19 RX ADMIN — ALLOPURINOL 300 MG: 300 TABLET ORAL at 08:27

## 2019-10-19 RX ADMIN — ASPIRIN 243 MG: 81 TABLET, COATED ORAL at 08:25

## 2019-10-19 RX ADMIN — PHENYTOIN SODIUM 300 MG: 100 CAPSULE ORAL at 08:25

## 2019-10-19 RX ADMIN — FAMOTIDINE 20 MG: 20 TABLET, FILM COATED ORAL at 17:08

## 2019-10-19 RX ADMIN — DOCUSATE SODIUM 100 MG: 100 CAPSULE, LIQUID FILLED ORAL at 17:08

## 2019-10-19 RX ADMIN — BISACODYL 10 MG: 10 SUPPOSITORY RECTAL at 12:37

## 2019-10-19 RX ADMIN — ATENOLOL 100 MG: 25 TABLET ORAL at 08:26

## 2019-10-19 RX ADMIN — POLYETHYLENE GLYCOL 3350 17 G: 17 POWDER, FOR SOLUTION ORAL at 12:37

## 2019-10-19 RX ADMIN — ATORVASTATIN CALCIUM 80 MG: 40 TABLET, FILM COATED ORAL at 22:34

## 2019-10-19 RX ADMIN — SPIRONOLACTONE 25 MG: 25 TABLET ORAL at 08:26

## 2019-10-19 RX ADMIN — FAMOTIDINE 20 MG: 20 TABLET, FILM COATED ORAL at 08:27

## 2019-10-19 RX ADMIN — POTASSIUM CHLORIDE 20 MEQ: 20 TABLET, EXTENDED RELEASE ORAL at 08:26

## 2019-10-19 RX ADMIN — AMLODIPINE BESYLATE 10 MG: 10 TABLET ORAL at 08:27

## 2019-10-19 RX ADMIN — DOCUSATE SODIUM 100 MG: 100 CAPSULE, LIQUID FILLED ORAL at 08:27

## 2019-10-19 RX ADMIN — DEXTROSE MONOHYDRATE, SODIUM CHLORIDE, AND POTASSIUM CHLORIDE 125 ML/HR: 50; 4.5; 1.49 INJECTION, SOLUTION INTRAVENOUS at 05:18

## 2019-10-19 RX ADMIN — LEVETIRACETAM 1500 MG: 500 TABLET ORAL at 17:08

## 2019-10-19 NOTE — PROGRESS NOTES
AZEEM drain pulled out during transfer from chair to bed. MD made aware. Patient reinforced to call with call light before transferring for help. Patient demonstrated and verbalized understanding. Will continue to monitor.

## 2019-10-19 NOTE — PROGRESS NOTES
Hourly rounds completed this shift. Patient resting in bed. Bowel sounds present in all 4 quadrants. Will continue to monitor and give report to oncoming RN.

## 2019-10-19 NOTE — PROGRESS NOTES
Urology Progress Note    Admit Date: 10/17/2019    Subjective:     Patient reports some flatus this AM.  No bowel movement. Accidentally pulled out AZEEM drain overnight. Pain controlled. Ambulating. Tolerating clears without nausea/emesis. Voiding spontaneously. Objective:     Patient Vitals for the past 8 hrs:   BP Temp Pulse Resp SpO2   10/19/19 0746 146/81 99.1 °F (37.3 °C) 67 18 95 %   10/19/19 0419 142/83 99 °F (37.2 °C) 66 16 95 %     No intake/output data recorded. 10/17 1901 - 10/19 0700  In: 480 [P.O.:480]  Out: 3775 [Urine:3675; Drains:100]    Physical Exam:   Visit Vitals  /81 (BP 1 Location: Left arm, BP Patient Position: At rest)   Pulse 67   Temp 99.1 °F (37.3 °C)   Resp 18   Wt 226 lb 12.8 oz (102.9 kg)   SpO2 95%   BMI 29.12 kg/m²        GENERAL: No acute distress, Awake, Alert, Oriented X 3, Gait normal  CARDIAC: regular rate and rhythm  CHEST AND LUNG: Easy work of breathing, clear to auscultation bilaterally, no cyanosis  ABDOMEN: soft, non tender, non-distended, positive bowel sounds, no organomegaly, no palpable masses, no guarding, no rebound tenderness, incisions c/d/i, AZEEM removed. SKIN: No rash, no erythema, no lacerations or abrasions, no ecchymosis  NEUROLOGIC: cranial nerves 2-12 grossly intact         Data Review No results found for this or any previous visit (from the past 24 hour(s)). Assessment:     Active Problems:    Renal mass (10/17/2019)      POD 2 s/p R ELZBIETA Partial Nx. Doing well. Plan:     -Regular diet  -SLIV  -Bowel regimen  -PO pain control  -OOB/Ambulate  -Dispo: Continue inpatient care. Possible D/C tomorrow if bowel movement and tolerates diet. Ishan Godinez M.D.     [facility]  [facility], [facility] [address]  Phone: [phone]  Fax: [phone]

## 2019-10-20 VITALS
BODY MASS INDEX: 29.12 KG/M2 | RESPIRATION RATE: 18 BRPM | OXYGEN SATURATION: 67 % | HEART RATE: 67 BPM | DIASTOLIC BLOOD PRESSURE: 79 MMHG | TEMPERATURE: 99.5 F | SYSTOLIC BLOOD PRESSURE: 149 MMHG | WEIGHT: 226.8 LBS

## 2019-10-20 LAB
ANION GAP SERPL CALC-SCNC: 4 MMOL/L (ref 7–16)
BUN SERPL-MCNC: 5 MG/DL (ref 8–23)
CALCIUM SERPL-MCNC: 8.6 MG/DL (ref 8.3–10.4)
CHLORIDE SERPL-SCNC: 101 MMOL/L (ref 98–107)
CO2 SERPL-SCNC: 31 MMOL/L (ref 21–32)
CREAT SERPL-MCNC: 0.85 MG/DL (ref 0.8–1.5)
ERYTHROCYTE [DISTWIDTH] IN BLOOD BY AUTOMATED COUNT: 12.1 % (ref 11.9–14.6)
GLUCOSE SERPL-MCNC: 109 MG/DL (ref 65–100)
HCT VFR BLD AUTO: 29.3 % (ref 41.1–50.3)
HGB BLD-MCNC: 9.7 G/DL (ref 13.6–17.2)
MCH RBC QN AUTO: 28.4 PG (ref 26.1–32.9)
MCHC RBC AUTO-ENTMCNC: 33.1 G/DL (ref 31.4–35)
MCV RBC AUTO: 85.7 FL (ref 79.6–97.8)
NRBC # BLD: 0 K/UL (ref 0–0.2)
PLATELET # BLD AUTO: 160 K/UL (ref 150–450)
PMV BLD AUTO: 9.5 FL (ref 9.4–12.3)
POTASSIUM SERPL-SCNC: 2.9 MMOL/L (ref 3.5–5.1)
RBC # BLD AUTO: 3.42 M/UL (ref 4.23–5.6)
SODIUM SERPL-SCNC: 138 MMOL/L (ref 136–145)
WBC # BLD AUTO: 5.6 K/UL (ref 4.3–11.1)

## 2019-10-20 PROCEDURE — 80048 BASIC METABOLIC PNL TOTAL CA: CPT

## 2019-10-20 PROCEDURE — 85027 COMPLETE CBC AUTOMATED: CPT

## 2019-10-20 PROCEDURE — 74011250637 HC RX REV CODE- 250/637: Performed by: UROLOGY

## 2019-10-20 RX ORDER — FACIAL-BODY WIPES
10 EACH TOPICAL DAILY
Qty: 7 SUPPOSITORY | Refills: 1 | Status: SHIPPED | OUTPATIENT
Start: 2019-10-20 | End: 2021-05-19 | Stop reason: CLARIF

## 2019-10-20 RX ORDER — HYDROCODONE BITARTRATE AND ACETAMINOPHEN 7.5; 325 MG/1; MG/1
1 TABLET ORAL
Qty: 20 TAB | Refills: 0 | Status: SHIPPED | OUTPATIENT
Start: 2019-10-20 | End: 2019-10-27

## 2019-10-20 RX ORDER — POLYETHYLENE GLYCOL 3350 17 G/17G
17 POWDER, FOR SOLUTION ORAL DAILY
Qty: 14 PACKET | Refills: 2 | Status: SHIPPED | OUTPATIENT
Start: 2019-10-20 | End: 2021-05-19 | Stop reason: CLARIF

## 2019-10-20 RX ADMIN — AMLODIPINE BESYLATE 10 MG: 10 TABLET ORAL at 08:10

## 2019-10-20 RX ADMIN — DOCUSATE SODIUM 100 MG: 100 CAPSULE, LIQUID FILLED ORAL at 08:10

## 2019-10-20 RX ADMIN — PHENYTOIN SODIUM 300 MG: 100 CAPSULE ORAL at 08:10

## 2019-10-20 RX ADMIN — SPIRONOLACTONE 25 MG: 25 TABLET ORAL at 08:10

## 2019-10-20 RX ADMIN — LEVETIRACETAM 1500 MG: 500 TABLET ORAL at 08:10

## 2019-10-20 RX ADMIN — ASPIRIN 81 MG: 81 TABLET, COATED ORAL at 09:00

## 2019-10-20 RX ADMIN — ATENOLOL 100 MG: 25 TABLET ORAL at 08:10

## 2019-10-20 RX ADMIN — ACETAMINOPHEN 650 MG: 325 TABLET, FILM COATED ORAL at 08:20

## 2019-10-20 RX ADMIN — ALLOPURINOL 300 MG: 300 TABLET ORAL at 08:10

## 2019-10-20 RX ADMIN — FAMOTIDINE 20 MG: 20 TABLET, FILM COATED ORAL at 08:10

## 2019-10-20 RX ADMIN — POTASSIUM CHLORIDE 20 MEQ: 20 TABLET, EXTENDED RELEASE ORAL at 08:10

## 2019-10-20 NOTE — DISCHARGE SUMMARY
Physician Discharge Summary    Name: Ramu Dutta  Medical Record Number: [id number]       Account Number:  [de-identified]  YOB: 1950                         Age:  71 y.o. Admit date:  10/17/2019                    Discharge date:  10/20/2019    Attending Physician:  Sedrick Mcneill            Service: SURGERY    Physician Summary completed by: Brenda Lion. 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    Cervical stenosis of spinal canal 12/16/2009    Chronic cystitis 07/16/2019    Constipation     Diverticulosis of colon     Dysarthria     Genital herpes     GERD (gastroesophageal reflux disease)     well controlled with medication    Gout, unspecified     Headache     Hypercholesteremia     Hypertension     managed with medications    Impotence     Nevus, non-neoplastic     Observed sleep apnea     patients wife states he stops breathing and pt is working on getting a sleep study- no cpap    Other specified disorder of male genital organs(608.89)     PUD (peptic ulcer disease)     gastric ulcer bleeding     Seizures ([facility] [address]     seizures started after stroke in 2015- pt denies any recent seizures    Stroke [facility]) 2015    Testicular mass        Allergies: Allergies   Allergen Reactions   24 [facility] Anell Plunk Medoxomil] Other (comments)     Other reaction(s): Other (comments)  Dizziness, loss of appetite. Dizziness, loss of appetite.  Viagra [Sildenafil] Unknown (comments)    Zonegran [Zonisamide] Other (comments)     confusion       Brief Hospital Course: The patient was admitted and had the procedure listed below performed without difficulty. His hospital course progressed as expected. No acute events occurred throughout his hospital stay. He was discharged in stable condition.  He will follow up in 1 week for staple removal. By discharge, he had return of bowel function, tolerated diet and had good pain control with stable post-op Hb and Cr. Admission Physical Exam notable for:    N/A    Admission Lab/Radiology studies notable for:   CT with R lower pole renal mass and renal cysts    Surgical Procedures:  Right Hand Assisted Laparoscopic Partial Nephrectomy     Condition at Discharge: Stable     Discharge Diagnoses:     Renal mass [N28.89]; Renal mass [N28.89]    Significant Diagnostic Studies and Procedures:  Noted in brief hospital course.     Consults:  None     Patient Disposition: home       Patient instructions/medications:    Current Facility-Administered Medications:     bisacodyl (DULCOLAX) suppository 10 mg, 10 mg, Rectal, DAILY, Hailee Valencia MD, 10 mg at 10/19/19 1237    polyethylene glycol (MIRALAX) packet 17 g, 17 g, Oral, DAILY, Hailee Valencia MD, 17 g at 10/19/19 1237    alum-mag hydroxide-simeth (MYLANTA) oral suspension 30 mL, 30 mL, Oral, Q4H PRN, Kamilah Long NP, 30 mL at 10/18/19 2344    allopurinol (ZYLOPRIM) tablet 300 mg, 300 mg, Oral, DAILY, Denton, Hubert P, DO, 300 mg at 10/19/19 0827    amLODIPine (NORVASC) tablet 10 mg, 10 mg, Oral, DAILY, Denton, Hubert P, DO, 10 mg at 10/19/19 0827    aspirin delayed-release tablet 243 mg, 243 mg, Oral, DAILY, Denton, Hubert P, DO, 243 mg at 10/19/19 0825    atenolol (TENORMIN) tablet 100 mg, 100 mg, Oral, DAILY, Denton, Hubert P, DO, 100 mg at 10/19/19 6967    atorvastatin (LIPITOR) tablet 80 mg, 80 mg, Oral, QHS, DentonCharity, DO, 80 mg at 10/19/19 2234    phenytoin ER (DILANTIN ER) ER capsule 60 mg (Patient Supplied), 60 mg, Oral, DAILY, Denton, Hubert P, DO    levETIRAcetam (KEPPRA) tablet 1,500 mg, 1,500 mg, Oral, BID, Denton, Hubert P, DO, 1,500 mg at 10/19/19 1708    phenytoin ER (DILANTIN ER) ER capsule 300 mg, 300 mg, Oral, DAILY, Hubert Hawk DO, 300 mg at 10/19/19 0825    potassium chloride (K-DUR, KLOR-CON) SR tablet 20 mEq, 20 mEq, Oral, DAILY, [place], Hubert P, DO, 20 mEq at 10/19/19 0826    famotidine (PEPCID) tablet 20 mg, 20 mg, Oral, BID, [place], Hubert P, DO, 20 mg at 10/19/19 1708    spironolactone (ALDACTONE) tablet 25 mg, 25 mg, Oral, DAILY, [place], Hubert P, DO, 25 mg at 10/19/19 2888    acetaminophen (TYLENOL) tablet 650 mg, 650 mg, Oral, Q4H PRN, [place], Randalyn Rings, DO    HYDROcodone-acetaminophen (NORCO) 7.5-325 mg per tablet 1 Tab, 1 Tab, Oral, Q4H PRN, [place], Hubert P, DO, 1 Tab at 10/18/19 0844    morphine injection 2 mg, 2 mg, IntraVENous, Q1H PRN, [place], Hubert P, DO, 2 mg at 10/17/19 1524    ondansetron (ZOFRAN) injection 4 mg, 4 mg, IntraVENous, Q4H PRN, [place], Hubert P, DO    diphenhydrAMINE (BENADRYL) injection 12.5 mg, 12.5 mg, IntraVENous, Q4H PRN, [place], Hubert P, DO    docusate sodium (COLACE) capsule 100 mg, 100 mg, Oral, BID, [place], Hubert P, DO, 100 mg at 10/19/19 1708    zolpidem (AMBIEN) tablet 5 mg, 5 mg, Oral, QHS PRN, [place], Hubert P, DO    benzocaine-menthol (CEPACOL) lozenge, 1 Lozenge, Oral, Q2H PRN, [place], Randalyn Rings, DO, 1 Lozenge at 10/18/19 9393    Pending items needing follow up: Kaila Brittany was instructed to follow up as indicated above. Signed:  Maria Luisa Luo. Rico Arrington M.D. [facility]  [address]  Phone: [phone]  Fax: [phone]    cc:  Primary Care Physician:  Verified  Referring physicians:     Additional provider(s):

## 2019-10-20 NOTE — PROGRESS NOTES
Pt is for discharge home today with no needs/supportive care orders recieved for CM at this time. Family will transport patient home. Patient hs met all treatment goals / milestones. CM will continue to monitor. Care Management Interventions  PCP Verified by CM: Yes  Last Visit to PCP: 03/20/19  Palliative Care Criteria Met (RRAT>21 & CHF Dx)?: No(RRAT 10 Dx Renal mass )  Mode of Transport at Discharge:  Other (see comment)  Transition of Care Consult (CM Consult): Discharge Planning  Discharge Durable Medical Equipment: No(Cane)  Physical Therapy Consult: Yes  Occupational Therapy Consult: No  Speech Therapy Consult: No  Current Support Network: Lives with Spouse  Confirm Follow Up Transport: Family  Plan discussed with Pt/Family/Caregiver: Yes  Freedom of Choice Offered: Yes  Discharge Location  Discharge Placement: Home

## 2019-10-20 NOTE — DISCHARGE INSTRUCTIONS
You are scheduled for follow up and staple removal on 10/31/19.      Please call [phone] with any questions/concerns.  No lifting > 10 pounds for 6 weeks.  OK to shower but no baths.  Call if fevers > 100.5, nausea, vomiting, pus draining from incisions or if incisions open up. DISCHARGE SUMMARY from Nurse    PATIENT INSTRUCTIONS:    After general anesthesia or intravenous sedation, for 24 hours or while taking prescription Narcotics:  · Limit your activities  · Do not drive and operate hazardous machinery  · Do not make important personal or business decisions  · Do  not drink alcoholic beverages  · If you have not urinated within 8 hours after discharge, please contact your surgeon on call. Report the following to your surgeon:  · Excessive pain, swelling, redness or odor of or around the surgical area  · Temperature over 100.5  · Nausea and vomiting lasting longer than 4 hours or if unable to take medications  · Any signs of decreased circulation or nerve impairment to extremity: change in color, persistent  numbness, tingling, coldness or increase pain  · Any questions    What to do at Home:  Recommended activity: No heavy lifting, pushing, pulling for 6 weeks, You may shower; NO TUB BATHS. No driving while taking narcotic pain medication. If you experience any of the following symptoms temperature greater than 101, inability to urinate, , please follow up with Urology [phone]. *  Please give a list of your current medications to your Primary Care Provider. *  Please update this list whenever your medications are discontinued, doses are      changed, or new medications (including over-the-counter products) are added. *  Please carry medication information at all times in case of emergency situations.     These are general instructions for a healthy lifestyle:    No smoking/ No tobacco products/ Avoid exposure to second hand smoke  Surgeon General's Warning:  Quitting smoking now greatly reduces serious risk to your health. Obesity, smoking, and sedentary lifestyle greatly increases your risk for illness    A healthy diet, regular physical exercise & weight monitoring are important for maintaining a healthy lifestyle    You may be retaining fluid if you have a history of heart failure or if you experience any of the following symptoms:  Weight gain of 3 pounds or more overnight or 5 pounds in a week, increased swelling in our hands or feet or shortness of breath while lying flat in bed. Please call your doctor as soon as you notice any of these symptoms; do not wait until your next office visit. The discharge information has been reviewed with the patient. The patient verbalized understanding. Discharge medications reviewed with the patientPatient Education        Laparoscopic Nephrectomy: What to Expect at [address] [place] [address]  A nephrectomy is surgery to take out part or all of the kidney. One or both kidneys may be taken out. Sometimes other tissue near the kidney is taken out at the same time. Your belly will feel sore after the surgery. This usually lasts about 1 to 2 weeks. Your doctor will give you pain medicine for this. You may also have other symptoms such as nausea, diarrhea, constipation, gas, or a headache. At first, you may have low energy and get tired quickly. It may take 3 to 6 months for your energy to fully return. Your body can work fine with one healthy kidney. If both kidneys are removed or your remaining kidney is not healthy, your doctor will talk to you about the kind of treatment you will need after surgery. This care sheet gives you a general idea about how long it will take for you to recover. But each person recovers at a different pace. Follow the steps below to get better as quickly as possible. How can you care for yourself at home? Activity    · Rest when you feel tired. Getting enough sleep will help you recover.     · Try to walk each day. Start by walking a little more than you did the day before. Bit by bit, increase the amount you walk. Walking boosts blood flow and helps prevent pneumonia and constipation.     · Avoid exercises that use your belly muscles and strenuous activities such as bicycle riding, jogging, weight lifting, or aerobic exercise until your doctor says it is okay.     · For at least 4 weeks, avoid lifting anything that would make you strain. This may include a child, heavy grocery bags and milk containers, a heavy briefcase or backpack, cat litter or dog food bags, or a vacuum .     · Hold a pillow over the cuts the doctor made (incisions) when you cough or take deep breaths. This will support your belly and decrease your pain.     · Do breathing exercises at home as instructed by your doctor. This will help prevent pneumonia.     · Ask your doctor when you can drive again.     · You will probably need to take 4 to 6 weeks off from work. It depends on the type of work you do and how you feel.     · You may be able to take showers (unless you have a drainage tube near your incisions). If you have a drainage tube, follow your doctor's instructions to empty and care for it. Do not take a bath for the first 2 weeks, or until your doctor tells you it is okay.     · Ask your doctor when it is okay for you to have sex. Diet    · You can eat your normal diet. If you were on a special diet for your kidneys before surgery, follow that diet until your doctor tells you to stop.     · If your stomach is upset, try bland, low-fat foods like plain rice, broiled chicken, toast, and yogurt.     · Drink plenty of fluids (unless your doctor tells you not to).     · You may notice that your bowel movements are not regular right after your surgery. This is common. Try to avoid constipation and straining with bowel movements. You may want to take a fiber supplement every day.  If you have not had a bowel movement after a couple of days, ask your doctor about taking a mild laxative. Medicines    · Your doctor will tell you if and when you can restart your medicines. He or she will also give you instructions about taking any new medicines.     · If you take blood thinners, such as warfarin (Coumadin), clopidogrel (Plavix), or aspirin, be sure to talk to your doctor. He or she will tell you if and when to start taking those medicines again. Make sure that you understand exactly what your doctor wants you to do.     · Take pain medicines exactly as directed. ? If the doctor gave you a prescription medicine for pain, take it as prescribed. ? If you are not taking a prescription pain medicine, take an over-the-counter medicine that your doctor recommends. Read and follow all instructions on the label. ? Do not take aspirin, ibuprofen (Advil, Motrin), or naproxen (Aleve), or other nonsteroidal anti-inflammatory drugs (NSAIDs) unless your doctor says it is okay.     · If you think your pain medicine is making you sick to your stomach:  ? Take your medicine after meals (unless your doctor has told you not to). ? Ask your doctor for a different pain medicine.     · If your doctor prescribed antibiotics, take them as directed. Do not stop taking them just because you feel better. You need to take the full course of antibiotics. Incision care    · If you have strips of tape on the incisions, leave the tape on for a week or until it falls off.     · Wash the area around the incisions daily with warm, soapy water and pat it dry. Don't use hydrogen peroxide or alcohol, which can slow healing. You may cover the incisions with gauze bandages if they weep or rub against clothing. Change the bandages every day.     · Keep the area around the incisions clean and dry. Follow-up care is a key part of your treatment and safety. Be sure to make and go to all appointments, and call your doctor if you are having problems.  It's also a good idea to know your test results and keep a list of the medicines you take. When should you call for help? Call 911 anytime you think you may need emergency care. For example, call if:    · You passed out (lost consciousness).     · You have chest pain, are short of breath, or cough up blood.    Call your doctor now or seek immediate medical care if:    · You have pain that does not get better after you take your pain medicine.     · You have symptoms of a urinary tract infection. These may include:  ? Pain or burning when you urinate. ? A frequent need to urinate without being able to pass much urine. ? Pain in the flank, which is just below the rib cage and above the waist on either side of the back. ? Blood in the urine. ? A fever.     · You have signs of infection, such as:  ? Increased pain, swelling, warmth, or redness. ? Red streaks leading from the incisions. ? Pus draining from the incisions. ? A fever.     · You have loose stitches, or your incisions come open.     · You are bleeding from the incisions.     · You cannot urinate.     · You are sick to your stomach or cannot drink fluids.     · You have signs of a blood clot in your leg (called a deep vein thrombosis), such as:  ? Pain in the calf, back of the knee, thigh, or groin. ? Redness and swelling in your leg.    Watch closely for changes in your health, and be sure to contact your doctor if you are having any problems. Where can you learn more? Go to http://nichelle-namrata.info/. Enter [id number] [id number] in the search box to learn more about \"Laparoscopic Nephrectomy: What to Expect at Home. \"  Current as of: October 31, 2018  Content Version: 12.2  © 4376-4336 [employer]. Care instructions adapted under license by [employer] (which disclaims liability or warranty for this information).  If you have questions about a medical condition or this instruction, always ask your healthcare professional. Eugene Johns any warranty or liability for your use of this information. and appropriate educational materials and side effects teaching were provided.   ___________________________________________________________________________________________________________________________________

## 2019-10-20 NOTE — PROGRESS NOTES
Urology Progress Note    Admit Date: 10/17/2019    Subjective:     Patient reports flatus and BM yesterday. Overall feels better this AM.  Afebrile. Pain controlled. Voiding. No nausea/emesis. Ambulating. Objective:     Patient Vitals for the past 8 hrs:   BP Temp Pulse Resp SpO2   10/20/19 0716 149/79 99.5 °F (37.5 °C) 67 18 (!) 67 %   10/20/19 0510 135/77 99.5 °F (37.5 °C) 70 18 94 %     No intake/output data recorded. 10/18 1901 - 10/20 0700  In: -   Out: 3770 [Urine:3725; Drains:45]    Physical Exam:   Visit Vitals  /79   Pulse 67   Temp 99.5 °F (37.5 °C)   Resp 18   Wt 226 lb 12.8 oz (102.9 kg)   SpO2 (!) 67%   BMI 29.12 kg/m²        GENERAL: No acute distress, Awake, Alert, Oriented X 3, Gait normal  CARDIAC: regular rate and rhythm  CHEST AND LUNG: Easy work of breathing, clear to auscultation bilaterally, no cyanosis  ABDOMEN: soft, non tender, non-distended, positive bowel sounds, no organomegaly, no palpable masses, no guarding, no rebound tenderness, incisions c/d/i, AZEEM removed.    SKIN: No rash, no erythema, no lacerations or abrasions, no ecchymosis  NEUROLOGIC: cranial nerves 2-12 grossly intact         Data Review   Recent Results (from the past 24 hour(s))   METABOLIC PANEL, BASIC    Collection Time: 10/19/19 11:04 AM   Result Value Ref Range    Sodium 135 (L) 136 - 145 mmol/L    Potassium 3.1 (L) 3.5 - 5.1 mmol/L    Chloride 100 98 - 107 mmol/L    CO2 31 21 - 32 mmol/L    Anion gap 4 (L) 7 - 16 mmol/L    Glucose 106 (H) 65 - 100 mg/dL    BUN 6 (L) 8 - 23 MG/DL    Creatinine 0.80 0.8 - 1.5 MG/DL    GFR est AA >60 >60 ml/min/1.73m2    GFR est non-AA >60 >60 ml/min/1.73m2    Calcium 8.3 8.3 - 10.4 MG/DL   CBC W/O DIFF    Collection Time: 10/20/19  5:32 AM   Result Value Ref Range    WBC 5.6 4.3 - 11.1 K/uL    RBC 3.42 (L) 4.23 - 5.6 M/uL    HGB 9.7 (L) 13.6 - 17.2 g/dL    HCT 29.3 (L) 41.1 - 50.3 %    MCV 85.7 79.6 - 97.8 FL    MCH 28.4 26.1 - 32.9 PG    MCHC 33.1 31.4 - 35.0 g/dL    RDW 12.1 11.9 - 14.6 %    PLATELET 442 327 - 991 K/uL    MPV 9.5 9.4 - 12.3 FL    ABSOLUTE NRBC 0.00 0.0 - 0.2 K/uL   METABOLIC PANEL, BASIC    Collection Time: 10/20/19  5:32 AM   Result Value Ref Range    Sodium 138 136 - 145 mmol/L    Potassium 2.9 (LL) 3.5 - 5.1 mmol/L    Chloride 101 98 - 107 mmol/L    CO2 31 21 - 32 mmol/L    Anion gap 4 (L) 7 - 16 mmol/L    Glucose 109 (H) 65 - 100 mg/dL    BUN 5 (L) 8 - 23 MG/DL    Creatinine 0.85 0.8 - 1.5 MG/DL    GFR est AA >60 >60 ml/min/1.73m2    GFR est non-AA >60 >60 ml/min/1.73m2    Calcium 8.6 8.3 - 10.4 MG/DL           Assessment:     Active Problems:    Renal mass (10/17/2019)      POD 3 s/p R [id number] Partial Nx. Doing well. Plan:     -Regular diet  -SLIV  -Bowel regimen  -PO pain control  -OOB/Ambulate  -Dispo: D/C today. F/U scheduled on 10/31/19 with NP for staple removal       Ishan Solomon M.D.     [facility]  [facility]  [address] [place] [address], [facility]   Phone: [phone]  Fax: [phone]

## 2019-10-20 NOTE — PROGRESS NOTES
Central line removed. Patient tolerated well. Tip intact. Waterproof dressing applied. Patient instructed to call when wife arrives for wheelchair out to car.

## 2021-04-14 ENCOUNTER — HOSPITAL ENCOUNTER (OUTPATIENT)
Dept: CT IMAGING | Age: 71
Discharge: HOME | End: 2021-04-14
Attending: NURSE PRACTITIONER
Payer: COMMERCIAL

## 2021-04-14 DIAGNOSIS — N28.89 RENAL MASS: ICD-10-CM

## 2021-04-14 LAB — CREAT BLD-MCNC: 1 MG/DL (ref 0.8–1.5)

## 2021-04-14 PROCEDURE — 74011000636 HC RX REV CODE- 636: Performed by: NURSE PRACTITIONER

## 2021-04-14 PROCEDURE — 74011000258 HC RX REV CODE- 258: Performed by: NURSE PRACTITIONER

## 2021-04-14 PROCEDURE — 82565 ASSAY OF CREATININE: CPT

## 2021-04-14 PROCEDURE — 74170 CT ABD WO CNTRST FLWD CNTRST: CPT

## 2021-04-14 RX ORDER — SODIUM CHLORIDE 0.9 % (FLUSH) 0.9 %
10 SYRINGE (ML) INJECTION
Status: COMPLETED | OUTPATIENT
Start: 2021-04-14 | End: 2021-04-14

## 2021-04-14 RX ADMIN — Medication 10 ML: at 14:25

## 2021-04-14 RX ADMIN — SODIUM CHLORIDE 100 ML: 900 INJECTION, SOLUTION INTRAVENOUS at 14:25

## 2021-04-14 RX ADMIN — IOPAMIDOL 100 ML: 755 INJECTION, SOLUTION INTRAVENOUS at 14:25

## 2021-05-13 RX ORDER — SODIUM CHLORIDE 9 MG/ML
250 INJECTION, SOLUTION INTRAVENOUS AS NEEDED
Status: CANCELLED | OUTPATIENT
Start: 2021-05-13

## 2021-05-19 ENCOUNTER — HOSPITAL ENCOUNTER (OUTPATIENT)
Dept: SURGERY | Age: 71
Discharge: HOME | End: 2021-05-19
Payer: COMMERCIAL

## 2021-05-19 ENCOUNTER — HOSPITAL ENCOUNTER (OUTPATIENT)
Dept: GENERAL RADIOLOGY | Age: 71
Discharge: HOME | End: 2021-05-19
Attending: UROLOGY
Payer: COMMERCIAL

## 2021-05-19 VITALS
OXYGEN SATURATION: 97 % | HEIGHT: 74 IN | RESPIRATION RATE: 16 BRPM | BODY MASS INDEX: 32.02 KG/M2 | WEIGHT: 249.5 LBS | TEMPERATURE: 97.3 F | HEART RATE: 60 BPM | DIASTOLIC BLOOD PRESSURE: 79 MMHG | SYSTOLIC BLOOD PRESSURE: 159 MMHG

## 2021-05-19 DIAGNOSIS — Z01.811 PRE-OP CHEST EXAM: ICD-10-CM

## 2021-05-19 LAB
ALBUMIN SERPL-MCNC: 3.7 G/DL (ref 3.2–4.6)
ALBUMIN/GLOB SERPL: 1 {RATIO} (ref 1.2–3.5)
ALP SERPL-CCNC: 120 U/L (ref 50–136)
ALT SERPL-CCNC: 26 U/L (ref 12–65)
ANION GAP SERPL CALC-SCNC: 4 MMOL/L (ref 7–16)
APPEARANCE UR: ABNORMAL
AST SERPL-CCNC: 18 U/L (ref 15–37)
BACTERIA URNS QL MICRO: 0 /HPF
BILIRUB SERPL-MCNC: 0.3 MG/DL (ref 0.2–1.1)
BILIRUB UR QL: NEGATIVE
BUN SERPL-MCNC: 12 MG/DL (ref 8–23)
CALCIUM SERPL-MCNC: 9.1 MG/DL (ref 8.3–10.4)
CASTS URNS QL MICRO: ABNORMAL /LPF
CHLORIDE SERPL-SCNC: 108 MMOL/L (ref 98–107)
CO2 SERPL-SCNC: 30 MMOL/L (ref 21–32)
COLOR UR: YELLOW
CREAT SERPL-MCNC: 0.94 MG/DL (ref 0.8–1.5)
EPI CELLS #/AREA URNS HPF: ABNORMAL /HPF
ERYTHROCYTE [DISTWIDTH] IN BLOOD BY AUTOMATED COUNT: 12.8 % (ref 11.9–14.6)
GLOBULIN SER CALC-MCNC: 3.7 G/DL (ref 2.3–3.5)
GLUCOSE SERPL-MCNC: 86 MG/DL (ref 65–100)
GLUCOSE UR STRIP.AUTO-MCNC: NEGATIVE MG/DL
HCT VFR BLD AUTO: 35.9 % (ref 41.1–50.3)
HGB BLD-MCNC: 11.3 G/DL (ref 13.6–17.2)
HGB UR QL STRIP: ABNORMAL
INR PPP: 1
KETONES UR QL STRIP.AUTO: NEGATIVE MG/DL
LEUKOCYTE ESTERASE UR QL STRIP.AUTO: ABNORMAL
MCH RBC QN AUTO: 28.2 PG (ref 26.1–32.9)
MCHC RBC AUTO-ENTMCNC: 31.5 G/DL (ref 31.4–35)
MCV RBC AUTO: 89.5 FL (ref 79.6–97.8)
NITRITE UR QL STRIP.AUTO: NEGATIVE
NRBC # BLD: 0 K/UL (ref 0–0.2)
PH UR STRIP: 7.5 [PH] (ref 5–9)
PLATELET # BLD AUTO: 180 K/UL (ref 150–450)
PMV BLD AUTO: 9.3 FL (ref 9.4–12.3)
POTASSIUM SERPL-SCNC: 3.4 MMOL/L (ref 3.5–5.1)
PROT SERPL-MCNC: 7.4 G/DL (ref 6.3–8.2)
PROT UR STRIP-MCNC: NEGATIVE MG/DL
PROTHROMBIN TIME: 13.2 SEC (ref 12.5–14.7)
RBC # BLD AUTO: 4.01 M/UL (ref 4.23–5.6)
RBC #/AREA URNS HPF: ABNORMAL /HPF
SODIUM SERPL-SCNC: 142 MMOL/L (ref 138–145)
SP GR UR REFRACTOMETRY: 1.01 (ref 1–1.02)
UROBILINOGEN UR QL STRIP.AUTO: 0.2 EU/DL (ref 0.2–1)
WBC # BLD AUTO: 5.4 K/UL (ref 4.3–11.1)
WBC URNS QL MICRO: ABNORMAL /HPF

## 2021-05-19 PROCEDURE — 93005 ELECTROCARDIOGRAM TRACING: CPT | Performed by: UROLOGY

## 2021-05-19 PROCEDURE — 81001 URINALYSIS AUTO W/SCOPE: CPT

## 2021-05-19 PROCEDURE — 80053 COMPREHEN METABOLIC PANEL: CPT

## 2021-05-19 PROCEDURE — 85027 COMPLETE CBC AUTOMATED: CPT

## 2021-05-19 PROCEDURE — 87086 URINE CULTURE/COLONY COUNT: CPT

## 2021-05-19 PROCEDURE — 85610 PROTHROMBIN TIME: CPT

## 2021-05-19 PROCEDURE — 71046 X-RAY EXAM CHEST 2 VIEWS: CPT

## 2021-05-19 RX ORDER — ATORVASTATIN CALCIUM 80 MG/1
80 TABLET, FILM COATED ORAL
COMMUNITY

## 2021-05-19 RX ORDER — POLYETHYLENE GLYCOL 3350 17 G/17G
17 POWDER, FOR SOLUTION ORAL AS NEEDED
COMMUNITY

## 2021-05-19 NOTE — PERIOP NOTES
PLEASE CONTINUE TAKING ALL PRESCRIPTION MEDICATIONS UP TO THE DAY OF SURGERY UNLESS OTHERWISE DIRECTED BELOW. DISCONTINUE all vitamins and supplements 7 days prior to surgery. DISCONTINUE Non-Steriodal Anti-Inflammatory (NSAIDS) such as Advil and Aleve 5 days prior to surgery. Home Medications to take  the day of surgery   Allopurinol (Zyloprim)  Amlodipine (Norvasc)   Aspirin 81mg as instructed by your surgeon. Atenolol (Tenormin)   Levetiracetam (Keppra)  Phenytoin (Dilantin)     Home Medications   to Hold     Vitamin B12     No Vitamins,supplements, Advil, Ibuprofen, Excedrin, Motrin, or Aleve. Comments        Return to [facility] lab on 5.25.21 for labs. Lab hours- 8:00-3:30. Bring COVID card to have scanned into your medical record. Visitor policy of 2 visitor per patient discussed. Please do not bring home medications with you o:n the day of surgery unless otherwise directed by your nurse. If you are instructed to bring home medications, please give them to your nurse as they will be administered by the nursing staff. If you have any questions, please call [facility] Claudia Ruelas [phone] or [facility] [phone]. A copy of this note was provided to the patient for reference.

## 2021-05-19 NOTE — PERIOP NOTES
Patient verified name and     Order for consent found in EHR and matches case posting; patient verified. Type 3 surgery, Walk in assessment complete. Labs per surgeon: CBC,CMP, PT, UA, UA cx,CXR, EKG, T/C; results pending  Labs per anesthesia protocol: no additional;  EKG: EKG 21/Neurologist note in care everywhere. Verified with Zully Peña, pt was instructed by Dr Mela Trivedi to take Aspirin 81mg daily. Pt has received COVID vaccine X2 doses, last vaccine administered 3.19.21. Pt to bring a copy of vaccine card on 21 when pt returns for T/C prior to surgery. Hospital approved surgical skin cleanser and instructions given per hospital policy. Patient provided with and instructed on educational handouts including Guide to Surgery, Pain Management, Hand Hygiene, Blood Transfusion Education, and [facility] Brochure. Patient answered medical/surgical history questions at their best of ability. All prior to admission medications documented in [facility]. Original medication prescription bottle were not visualized during patient appointment. Patient instructed to hold all vitamins 7 days prior to surgery and NSAIDS 5 days prior to surgery, patient verbalized understanding. Patient teach back successful and patient demonstrates knowledge of instructions.

## 2021-05-19 NOTE — PERIOP NOTES
Recent Results (from the past 12 hour(s))   CBC W/O DIFF    Collection Time: 05/19/21  2:01 PM   Result Value Ref Range    WBC 5.4 4.3 - 11.1 K/uL    RBC 4.01 (L) 4.23 - 5.6 M/uL    HGB 11.3 (L) 13.6 - 17.2 g/dL    HCT 35.9 (L) 41.1 - 50.3 %    MCV 89.5 79.6 - 97.8 FL    MCH 28.2 26.1 - 32.9 PG    MCHC 31.5 31.4 - 35.0 g/dL    RDW 12.8 11.9 - 14.6 %    PLATELET 217 383 - 482 K/uL    MPV 9.3 (L) 9.4 - 12.3 FL    ABSOLUTE NRBC 0.00 0.0 - 0.2 K/uL   METABOLIC PANEL, COMPREHENSIVE    Collection Time: 05/19/21  2:01 PM   Result Value Ref Range    Sodium 142 138 - 145 mmol/L    Potassium 3.4 (L) 3.5 - 5.1 mmol/L    Chloride 108 (H) 98 - 107 mmol/L    CO2 30 21 - 32 mmol/L    Anion gap 4 (L) 7 - 16 mmol/L    Glucose 86 65 - 100 mg/dL    BUN 12 8 - 23 MG/DL    Creatinine 0.94 0.8 - 1.5 MG/DL    GFR est AA >60 >60 ml/min/1.73m2    GFR est non-AA >60 >60 ml/min/1.73m2    Calcium 9.1 8.3 - 10.4 MG/DL    Bilirubin, total 0.3 0.2 - 1.1 MG/DL    ALT (SGPT) 26 12 - 65 U/L    AST (SGOT) 18 15 - 37 U/L    Alk.  phosphatase 120 50 - 136 U/L    Protein, total 7.4 6.3 - 8.2 g/dL    Albumin 3.7 3.2 - 4.6 g/dL    Globulin 3.7 (H) 2.3 - 3.5 g/dL    A-G Ratio 1.0 (L) 1.2 - 3.5     PROTHROMBIN TIME + INR    Collection Time: 05/19/21  2:01 PM   Result Value Ref Range    Prothrombin time 13.2 12.5 - 14.7 sec    INR 1.0     URINALYSIS W/ RFLX MICROSCOPIC    Collection Time: 05/19/21  2:01 PM   Result Value Ref Range    Color YELLOW      Appearance HAZY      Specific gravity 1.012 1.001 - 1.023      pH (UA) 7.5 5.0 - 9.0      Protein Negative NEG mg/dL    Glucose Negative mg/dL    Ketone Negative NEG mg/dL    Bilirubin Negative NEG      Blood TRACE (A) NEG      Urobilinogen 0.2 0.2 - 1.0 EU/dL    Nitrites Negative NEG      Leukocyte Esterase LARGE (A) NEG      WBC  0 /hpf    RBC 0-3 0 /hpf    Epithelial cells 0-3 0 /hpf    Bacteria 0 0 /hpf    Casts 0-3 0 /lpf   EKG, 12 LEAD, INITIAL    Collection Time: 05/19/21  2:06 PM   Result Value Ref Range    Ventricular Rate 53 BPM    Atrial Rate 53 BPM    P-R Interval 288 ms    QRS Duration 112 ms    Q-T Interval 432 ms    QTC Calculation (Bezet) 405 ms    Calculated P Axis 97 degrees    Calculated R Axis 21 degrees    Calculated T Axis 69 degrees    Diagnosis       Sinus bradycardia with 1st degree A-V block  Nonspecific T wave abnormality  Abnormal ECG  When compared with ECG of 19-SEP-2019 13:55,  No significant change was found       EXAM: XR CHEST PA LAT     INDICATION: Preop clearance     COMPARISON: 10/17/2019.     FINDINGS: PA and lateral radiographs of the chest demonstrate clear lungs.  The  cardiac and mediastinal contours and pulmonary vascularity are normal. The bones  and soft tissues are within normal limits.      IMPRESSION  Normal chest.

## 2021-05-19 NOTE — PERIOP NOTES
All labs reviewed. CXR WNL. UA with blood and large amount of Leukocyte. All labs routed to surgeon office. Chart flagged to have charge nurse follow up on UA.

## 2021-05-20 LAB
ATRIAL RATE: 53 BPM
CALCULATED P AXIS, ECG09: 97 DEGREES
CALCULATED R AXIS, ECG10: 21 DEGREES
CALCULATED T AXIS, ECG11: 69 DEGREES
DIAGNOSIS, 93000: NORMAL
P-R INTERVAL, ECG05: 288 MS
Q-T INTERVAL, ECG07: 432 MS
QRS DURATION, ECG06: 112 MS
QTC CALCULATION (BEZET), ECG08: 405 MS
VENTRICULAR RATE, ECG03: 53 BPM

## 2021-05-21 LAB
BACTERIA SPEC CULT: NORMAL
SERVICE CMNT-IMP: NORMAL

## 2021-05-21 NOTE — PERIOP NOTES
Final UA results with <10,000 COLONIES/mL MIXED SKIN ANDERSON ISOLATED. Results routed to surgeons office.

## 2021-05-21 NOTE — PERIOP NOTES
5/21/2021  8:48 AM - Johnathan, Lab In [facility]    Specimen Information: Clean catch; Urine    URINE        Component Value Flag Ref Range Units Status   Special Requests:      Final   NO SPECIAL REQUESTS    Culture result:      Final   <10,000 COLONIES/mL MIXED SKIN ANDERSON ISOLATED

## 2021-05-25 ENCOUNTER — ANESTHESIA EVENT (OUTPATIENT)
Dept: SURGERY | Age: 71
DRG: 657 | End: 2021-05-25
Payer: MEDICARE

## 2021-05-25 NOTE — PERIOP NOTES
Patient has not come for T&C today. Surgery is scheduled for tomorrow. Spoke with patient and he states he forgot to come in. Talked to Main preop staff [facility])  and informed patient does not have T&C done. Per staff, just have patient arrive on time as instructed and T&C will be obtained at that time. Spoke with patient and instructed of this. Also reminded patient to bring his covid vaccine card tomorrow to be scanned into the system.

## 2021-05-26 ENCOUNTER — HOSPITAL ENCOUNTER (INPATIENT)
Age: 71
LOS: 4 days | Discharge: HOME | DRG: 657 | End: 2021-05-30
Attending: UROLOGY | Admitting: UROLOGY
Payer: MEDICARE

## 2021-05-26 ENCOUNTER — ANESTHESIA (OUTPATIENT)
Dept: SURGERY | Age: 71
DRG: 657 | End: 2021-05-26
Payer: MEDICARE

## 2021-05-26 DIAGNOSIS — N28.89 RENAL MASS: ICD-10-CM

## 2021-05-26 LAB
HCT VFR BLD AUTO: 31.1 % (ref 41.1–50.3)
HGB BLD-MCNC: 10.2 G/DL (ref 13.6–17.2)
HISTORY CHECKED?,CKHIST: NORMAL

## 2021-05-26 PROCEDURE — 2709999900 HC NON-CHARGEABLE SUPPLY

## 2021-05-26 PROCEDURE — 77030018875 HC APPL CLP LIG4 J&J -B: Performed by: UROLOGY

## 2021-05-26 PROCEDURE — 74011000258 HC RX REV CODE- 258: Performed by: UROLOGY

## 2021-05-26 PROCEDURE — 77030011450 HC HNDPC AR BM COVD -B: Performed by: UROLOGY

## 2021-05-26 PROCEDURE — 77030008462 HC STPLR SKN PROX J&J -A: Performed by: UROLOGY

## 2021-05-26 PROCEDURE — 74011000250 HC RX REV CODE- 250: Performed by: NURSE ANESTHETIST, CERTIFIED REGISTERED

## 2021-05-26 PROCEDURE — 77030014007 HC SPNG HEMSTAT J&J -B: Performed by: UROLOGY

## 2021-05-26 PROCEDURE — 85018 HEMOGLOBIN: CPT

## 2021-05-26 PROCEDURE — 74011250636 HC RX REV CODE- 250/636: Performed by: UROLOGY

## 2021-05-26 PROCEDURE — 74011250637 HC RX REV CODE- 250/637: Performed by: UROLOGY

## 2021-05-26 PROCEDURE — 74011250636 HC RX REV CODE- 250/636: Performed by: NURSE ANESTHETIST, CERTIFIED REGISTERED

## 2021-05-26 PROCEDURE — 77030034154 HC SHR COAG HARM ACE J&J -F: Performed by: UROLOGY

## 2021-05-26 PROCEDURE — 74011250636 HC RX REV CODE- 250/636: Performed by: ANESTHESIOLOGY

## 2021-05-26 PROCEDURE — 77030018390 HC SPNG HEMSTAT2 J&J -B: Performed by: UROLOGY

## 2021-05-26 PROCEDURE — 77030014008 HC SPNG HEMSTAT J&J -C: Performed by: UROLOGY

## 2021-05-26 PROCEDURE — 77030018684: Performed by: UROLOGY

## 2021-05-26 PROCEDURE — 50543 LAPARO PARTIAL NEPHRECTOMY: CPT | Performed by: UROLOGY

## 2021-05-26 PROCEDURE — 77030008542 HC TBNG MON PRSS EDWD -A: Performed by: ANESTHESIOLOGY

## 2021-05-26 PROCEDURE — 74011000250 HC RX REV CODE- 250: Performed by: ANESTHESIOLOGY

## 2021-05-26 PROCEDURE — 77030019908 HC STETH ESOPH SIMS -A: Performed by: ANESTHESIOLOGY

## 2021-05-26 PROCEDURE — 74011250637 HC RX REV CODE- 250/637: Performed by: ANESTHESIOLOGY

## 2021-05-26 PROCEDURE — 77030009527 HC GEL PRT SYS AMR -E: Performed by: UROLOGY

## 2021-05-26 PROCEDURE — 77030000038 HC TIP SCIS LAPSCP SURI -B: Performed by: UROLOGY

## 2021-05-26 PROCEDURE — 74011000250 HC RX REV CODE- 250: Performed by: UROLOGY

## 2021-05-26 PROCEDURE — 77030006674 HC BLD MYRIN GYRS -B: Performed by: UROLOGY

## 2021-05-26 PROCEDURE — 77030028270 HC SRGFL HEMSTAT MTRX J&J -C: Performed by: UROLOGY

## 2021-05-26 PROCEDURE — 76010000172 HC OR TIME 2.5 TO 3 HR INTENSV-TIER 1: Performed by: UROLOGY

## 2021-05-26 PROCEDURE — 77030040361 HC SLV COMPR DVT MDII -B: Performed by: UROLOGY

## 2021-05-26 PROCEDURE — 0TQ14ZZ REPAIR LEFT KIDNEY, PERCUTANEOUS ENDOSCOPIC APPROACH: ICD-10-PCS | Performed by: UROLOGY

## 2021-05-26 PROCEDURE — 77030008518 HC TBNG INSUF ENDO STRY -B: Performed by: UROLOGY

## 2021-05-26 PROCEDURE — 77030009407 HC ELECTRD ENDO COVD -B: Performed by: UROLOGY

## 2021-05-26 PROCEDURE — 77030033162 HC PCH SPEC RTVR ENDOSC AMR -B: Performed by: UROLOGY

## 2021-05-26 PROCEDURE — 65270000029 HC RM PRIVATE

## 2021-05-26 PROCEDURE — 88305 TISSUE EXAM BY PATHOLOGIST: CPT

## 2021-05-26 PROCEDURE — 77030014090 HC TRCR OPT AMR -B: Performed by: UROLOGY

## 2021-05-26 PROCEDURE — 77030008606 HC TRCR ENDOSC KII AMR -B: Performed by: UROLOGY

## 2021-05-26 PROCEDURE — 77030039425 HC BLD LARYNG TRULITE DISP TELE -A: Performed by: ANESTHESIOLOGY

## 2021-05-26 PROCEDURE — 77030013794 HC KT TRNSDUC BLD EDWD -B: Performed by: ANESTHESIOLOGY

## 2021-05-26 PROCEDURE — 86923 COMPATIBILITY TEST ELECTRIC: CPT

## 2021-05-26 PROCEDURE — 77030018673: Performed by: UROLOGY

## 2021-05-26 PROCEDURE — 77030040830 HC CATH URETH FOL MDII -A: Performed by: UROLOGY

## 2021-05-26 PROCEDURE — 76210000006 HC OR PH I REC 0.5 TO 1 HR: Performed by: UROLOGY

## 2021-05-26 PROCEDURE — 77030005401 HC CATH RAD ARRO -A: Performed by: ANESTHESIOLOGY

## 2021-05-26 PROCEDURE — 77030031139 HC SUT VCRL2 J&J -A: Performed by: UROLOGY

## 2021-05-26 PROCEDURE — 77030002966 HC SUT PDS J&J -A: Performed by: UROLOGY

## 2021-05-26 PROCEDURE — 0TB14ZZ EXCISION OF LEFT KIDNEY, PERCUTANEOUS ENDOSCOPIC APPROACH: ICD-10-PCS | Performed by: UROLOGY

## 2021-05-26 PROCEDURE — 77030016151 HC PROTCTR LNS DFOG COVD -B: Performed by: UROLOGY

## 2021-05-26 PROCEDURE — 77030008756 HC TU IRR SUC STRY -B: Performed by: UROLOGY

## 2021-05-26 PROCEDURE — 86901 BLOOD TYPING SEROLOGIC RH(D): CPT

## 2021-05-26 PROCEDURE — 77030006984: Performed by: UROLOGY

## 2021-05-26 PROCEDURE — 77030002896 HC SUT CLP ABSRB J&J -B: Performed by: UROLOGY

## 2021-05-26 PROCEDURE — 77030013292 HC BOWL MX PRSM J&J -A: Performed by: ANESTHESIOLOGY

## 2021-05-26 PROCEDURE — 2709999900 HC NON-CHARGEABLE SUPPLY: Performed by: UROLOGY

## 2021-05-26 PROCEDURE — 77030037088 HC TUBE ENDOTRACH ORAL NSL COVD-A: Performed by: ANESTHESIOLOGY

## 2021-05-26 PROCEDURE — 76060000036 HC ANESTHESIA 2.5 TO 3 HR: Performed by: UROLOGY

## 2021-05-26 PROCEDURE — 77030040922 HC BLNKT HYPOTHRM STRY -A: Performed by: ANESTHESIOLOGY

## 2021-05-26 RX ORDER — DOCUSATE SODIUM 100 MG/1
100 CAPSULE, LIQUID FILLED ORAL 2 TIMES DAILY
Status: DISCONTINUED | OUTPATIENT
Start: 2021-05-26 | End: 2021-05-30 | Stop reason: HOSPADM

## 2021-05-26 RX ORDER — EPHEDRINE SULFATE/0.9% NACL/PF 50 MG/5 ML
SYRINGE (ML) INTRAVENOUS AS NEEDED
Status: DISCONTINUED | OUTPATIENT
Start: 2021-05-26 | End: 2021-05-26 | Stop reason: HOSPADM

## 2021-05-26 RX ORDER — ONDANSETRON 2 MG/ML
4 INJECTION INTRAMUSCULAR; INTRAVENOUS
Status: DISCONTINUED | OUTPATIENT
Start: 2021-05-26 | End: 2021-05-26 | Stop reason: HOSPADM

## 2021-05-26 RX ORDER — SPIRONOLACTONE 25 MG/1
25 TABLET ORAL
Status: COMPLETED | OUTPATIENT
Start: 2021-05-26 | End: 2021-05-26

## 2021-05-26 RX ORDER — MORPHINE SULFATE 2 MG/ML
2 INJECTION, SOLUTION INTRAMUSCULAR; INTRAVENOUS
Status: DISCONTINUED | OUTPATIENT
Start: 2021-05-26 | End: 2021-05-30 | Stop reason: HOSPADM

## 2021-05-26 RX ORDER — FENTANYL CITRATE 50 UG/ML
100 INJECTION, SOLUTION INTRAMUSCULAR; INTRAVENOUS ONCE
Status: DISCONTINUED | OUTPATIENT
Start: 2021-05-26 | End: 2021-05-26 | Stop reason: HOSPADM

## 2021-05-26 RX ORDER — GLYCOPYRROLATE 0.2 MG/ML
INJECTION INTRAMUSCULAR; INTRAVENOUS AS NEEDED
Status: DISCONTINUED | OUTPATIENT
Start: 2021-05-26 | End: 2021-05-26 | Stop reason: HOSPADM

## 2021-05-26 RX ORDER — ONDANSETRON 2 MG/ML
INJECTION INTRAMUSCULAR; INTRAVENOUS AS NEEDED
Status: DISCONTINUED | OUTPATIENT
Start: 2021-05-26 | End: 2021-05-26 | Stop reason: HOSPADM

## 2021-05-26 RX ORDER — HYDROMORPHONE HYDROCHLORIDE 2 MG/ML
0.5 INJECTION, SOLUTION INTRAMUSCULAR; INTRAVENOUS; SUBCUTANEOUS
Status: DISCONTINUED | OUTPATIENT
Start: 2021-05-26 | End: 2021-05-26 | Stop reason: HOSPADM

## 2021-05-26 RX ORDER — BUPIVACAINE HYDROCHLORIDE 5 MG/ML
INJECTION, SOLUTION EPIDURAL; INTRACAUDAL AS NEEDED
Status: DISCONTINUED | OUTPATIENT
Start: 2021-05-26 | End: 2021-05-26 | Stop reason: HOSPADM

## 2021-05-26 RX ORDER — AMLODIPINE BESYLATE 10 MG/1
10 TABLET ORAL DAILY
Status: DISCONTINUED | OUTPATIENT
Start: 2021-05-27 | End: 2021-05-30 | Stop reason: HOSPADM

## 2021-05-26 RX ORDER — ATENOLOL 50 MG/1
100 TABLET ORAL DAILY
Status: DISCONTINUED | OUTPATIENT
Start: 2021-05-27 | End: 2021-05-30 | Stop reason: HOSPADM

## 2021-05-26 RX ORDER — HALOPERIDOL 5 MG/ML
1 INJECTION INTRAMUSCULAR
Status: DISCONTINUED | OUTPATIENT
Start: 2021-05-26 | End: 2021-05-26 | Stop reason: HOSPADM

## 2021-05-26 RX ORDER — BUPIVACAINE HYDROCHLORIDE AND EPINEPHRINE 2.5; 5 MG/ML; UG/ML
INJECTION, SOLUTION EPIDURAL; INFILTRATION; INTRACAUDAL; PERINEURAL
Status: COMPLETED | OUTPATIENT
Start: 2021-05-26 | End: 2021-05-26

## 2021-05-26 RX ORDER — ONDANSETRON 2 MG/ML
4 INJECTION INTRAMUSCULAR; INTRAVENOUS
Status: DISCONTINUED | OUTPATIENT
Start: 2021-05-26 | End: 2021-05-30 | Stop reason: HOSPADM

## 2021-05-26 RX ORDER — POTASSIUM CHLORIDE 20 MEQ/1
20 TABLET, EXTENDED RELEASE ORAL DAILY
Status: DISCONTINUED | OUTPATIENT
Start: 2021-05-27 | End: 2021-05-30 | Stop reason: HOSPADM

## 2021-05-26 RX ORDER — OXYBUTYNIN CHLORIDE 5 MG/1
5 TABLET ORAL
Status: DISCONTINUED | OUTPATIENT
Start: 2021-05-26 | End: 2021-05-29

## 2021-05-26 RX ORDER — DEXTROSE MONOHYDRATE AND SODIUM CHLORIDE 5; .45 G/100ML; G/100ML
125 INJECTION, SOLUTION INTRAVENOUS CONTINUOUS
Status: DISCONTINUED | OUTPATIENT
Start: 2021-05-26 | End: 2021-05-29

## 2021-05-26 RX ORDER — SODIUM CHLORIDE, SODIUM LACTATE, POTASSIUM CHLORIDE, CALCIUM CHLORIDE 600; 310; 30; 20 MG/100ML; MG/100ML; MG/100ML; MG/100ML
75 INJECTION, SOLUTION INTRAVENOUS CONTINUOUS
Status: DISCONTINUED | OUTPATIENT
Start: 2021-05-26 | End: 2021-05-26 | Stop reason: HOSPADM

## 2021-05-26 RX ORDER — DEXAMETHASONE SODIUM PHOSPHATE 4 MG/ML
INJECTION, SOLUTION INTRA-ARTICULAR; INTRALESIONAL; INTRAMUSCULAR; INTRAVENOUS; SOFT TISSUE AS NEEDED
Status: DISCONTINUED | OUTPATIENT
Start: 2021-05-26 | End: 2021-05-26 | Stop reason: HOSPADM

## 2021-05-26 RX ORDER — ATROPA BELLADONNA AND OPIUM 16.2; 6 MG/1; MG/1
1 SUPPOSITORY RECTAL
Status: DISCONTINUED | OUTPATIENT
Start: 2021-05-26 | End: 2021-05-30 | Stop reason: HOSPADM

## 2021-05-26 RX ORDER — SODIUM CHLORIDE, SODIUM LACTATE, POTASSIUM CHLORIDE, CALCIUM CHLORIDE 600; 310; 30; 20 MG/100ML; MG/100ML; MG/100ML; MG/100ML
25 INJECTION, SOLUTION INTRAVENOUS CONTINUOUS
Status: DISCONTINUED | OUTPATIENT
Start: 2021-05-26 | End: 2021-05-26 | Stop reason: HOSPADM

## 2021-05-26 RX ORDER — VECURONIUM BROMIDE FOR INJECTION 1 MG/ML
INJECTION, POWDER, LYOPHILIZED, FOR SOLUTION INTRAVENOUS AS NEEDED
Status: DISCONTINUED | OUTPATIENT
Start: 2021-05-26 | End: 2021-05-26 | Stop reason: HOSPADM

## 2021-05-26 RX ORDER — ZOLPIDEM TARTRATE 5 MG/1
5 TABLET ORAL
Status: DISCONTINUED | OUTPATIENT
Start: 2021-05-26 | End: 2021-05-30 | Stop reason: HOSPADM

## 2021-05-26 RX ORDER — NALOXONE HYDROCHLORIDE 0.4 MG/ML
0.2 INJECTION, SOLUTION INTRAMUSCULAR; INTRAVENOUS; SUBCUTANEOUS AS NEEDED
Status: DISCONTINUED | OUTPATIENT
Start: 2021-05-26 | End: 2021-05-26 | Stop reason: HOSPADM

## 2021-05-26 RX ORDER — DIPHENHYDRAMINE HYDROCHLORIDE 50 MG/ML
12.5 INJECTION, SOLUTION INTRAMUSCULAR; INTRAVENOUS
Status: DISCONTINUED | OUTPATIENT
Start: 2021-05-26 | End: 2021-05-30 | Stop reason: HOSPADM

## 2021-05-26 RX ORDER — HYDROCODONE BITARTRATE AND ACETAMINOPHEN 5; 325 MG/1; MG/1
1 TABLET ORAL
Status: DISCONTINUED | OUTPATIENT
Start: 2021-05-26 | End: 2021-05-30 | Stop reason: HOSPADM

## 2021-05-26 RX ORDER — MANNITOL 250 MG/ML
12.5 INJECTION, SOLUTION INTRAVENOUS ONCE
Status: DISCONTINUED | OUTPATIENT
Start: 2021-05-26 | End: 2021-05-26

## 2021-05-26 RX ORDER — SODIUM CHLORIDE 9 MG/ML
250 INJECTION, SOLUTION INTRAVENOUS AS NEEDED
Status: DISCONTINUED | OUTPATIENT
Start: 2021-05-26 | End: 2021-05-26 | Stop reason: HOSPADM

## 2021-05-26 RX ORDER — ACETAMINOPHEN 500 MG
1000 TABLET ORAL ONCE
Status: COMPLETED | OUTPATIENT
Start: 2021-05-26 | End: 2021-05-26

## 2021-05-26 RX ORDER — PHENYTOIN SODIUM 100 MG/1
300 CAPSULE, EXTENDED RELEASE ORAL DAILY
Status: DISCONTINUED | OUTPATIENT
Start: 2021-05-27 | End: 2021-05-30 | Stop reason: HOSPADM

## 2021-05-26 RX ORDER — CEFAZOLIN SODIUM/WATER 2 G/20 ML
2 SYRINGE (ML) INTRAVENOUS ONCE
Status: COMPLETED | OUTPATIENT
Start: 2021-05-26 | End: 2021-05-26

## 2021-05-26 RX ORDER — NEOSTIGMINE METHYLSULFATE 1 MG/ML
INJECTION, SOLUTION INTRAVENOUS AS NEEDED
Status: DISCONTINUED | OUTPATIENT
Start: 2021-05-26 | End: 2021-05-26 | Stop reason: HOSPADM

## 2021-05-26 RX ORDER — MIDAZOLAM HYDROCHLORIDE 1 MG/ML
2 INJECTION, SOLUTION INTRAMUSCULAR; INTRAVENOUS ONCE
Status: DISCONTINUED | OUTPATIENT
Start: 2021-05-26 | End: 2021-05-26 | Stop reason: HOSPADM

## 2021-05-26 RX ORDER — LIDOCAINE HYDROCHLORIDE ANHYDROUS AND DEXTROSE MONOHYDRATE .8; 5 G/100ML; G/100ML
INJECTION, SOLUTION INTRAVENOUS
Status: DISCONTINUED | OUTPATIENT
Start: 2021-05-26 | End: 2021-05-26 | Stop reason: HOSPADM

## 2021-05-26 RX ORDER — MANNITOL 250 MG/ML
INJECTION, SOLUTION INTRAVENOUS AS NEEDED
Status: DISCONTINUED | OUTPATIENT
Start: 2021-05-26 | End: 2021-05-26 | Stop reason: HOSPADM

## 2021-05-26 RX ORDER — ATORVASTATIN CALCIUM 40 MG/1
80 TABLET, FILM COATED ORAL
Status: DISCONTINUED | OUTPATIENT
Start: 2021-05-26 | End: 2021-05-30 | Stop reason: HOSPADM

## 2021-05-26 RX ORDER — LEVETIRACETAM 500 MG/1
1500 TABLET ORAL 2 TIMES DAILY
Status: DISCONTINUED | OUTPATIENT
Start: 2021-05-26 | End: 2021-05-30 | Stop reason: HOSPADM

## 2021-05-26 RX ORDER — TAMSULOSIN HYDROCHLORIDE 0.4 MG/1
0.4 CAPSULE ORAL
Status: DISCONTINUED | OUTPATIENT
Start: 2021-05-26 | End: 2021-05-30 | Stop reason: HOSPADM

## 2021-05-26 RX ORDER — LIDOCAINE HYDROCHLORIDE 20 MG/ML
INJECTION, SOLUTION EPIDURAL; INFILTRATION; INTRACAUDAL; PERINEURAL AS NEEDED
Status: DISCONTINUED | OUTPATIENT
Start: 2021-05-26 | End: 2021-05-26 | Stop reason: HOSPADM

## 2021-05-26 RX ORDER — LIDOCAINE HYDROCHLORIDE 10 MG/ML
0.1 INJECTION INFILTRATION; PERINEURAL AS NEEDED
Status: DISCONTINUED | OUTPATIENT
Start: 2021-05-26 | End: 2021-05-26 | Stop reason: HOSPADM

## 2021-05-26 RX ORDER — NALOXONE HYDROCHLORIDE 0.4 MG/ML
0.2 INJECTION, SOLUTION INTRAMUSCULAR; INTRAVENOUS; SUBCUTANEOUS
Status: DISCONTINUED | OUTPATIENT
Start: 2021-05-26 | End: 2021-05-26 | Stop reason: HOSPADM

## 2021-05-26 RX ORDER — ALLOPURINOL 300 MG/1
300 TABLET ORAL DAILY
Status: DISCONTINUED | OUTPATIENT
Start: 2021-05-27 | End: 2021-05-30 | Stop reason: HOSPADM

## 2021-05-26 RX ORDER — FENTANYL CITRATE 50 UG/ML
INJECTION, SOLUTION INTRAMUSCULAR; INTRAVENOUS AS NEEDED
Status: DISCONTINUED | OUTPATIENT
Start: 2021-05-26 | End: 2021-05-26 | Stop reason: HOSPADM

## 2021-05-26 RX ORDER — PROPOFOL 10 MG/ML
INJECTION, EMULSION INTRAVENOUS AS NEEDED
Status: DISCONTINUED | OUTPATIENT
Start: 2021-05-26 | End: 2021-05-26 | Stop reason: HOSPADM

## 2021-05-26 RX ORDER — DEXAMETHASONE SODIUM PHOSPHATE 4 MG/ML
INJECTION, SOLUTION INTRA-ARTICULAR; INTRALESIONAL; INTRAMUSCULAR; INTRAVENOUS; SOFT TISSUE
Status: COMPLETED | OUTPATIENT
Start: 2021-05-26 | End: 2021-05-26

## 2021-05-26 RX ORDER — ACETAMINOPHEN 325 MG/1
650 TABLET ORAL
Status: DISCONTINUED | OUTPATIENT
Start: 2021-05-26 | End: 2021-05-30 | Stop reason: HOSPADM

## 2021-05-26 RX ORDER — ROCURONIUM BROMIDE 10 MG/ML
INJECTION, SOLUTION INTRAVENOUS AS NEEDED
Status: DISCONTINUED | OUTPATIENT
Start: 2021-05-26 | End: 2021-05-26 | Stop reason: HOSPADM

## 2021-05-26 RX ORDER — MIDAZOLAM HYDROCHLORIDE 1 MG/ML
2 INJECTION, SOLUTION INTRAMUSCULAR; INTRAVENOUS
Status: DISCONTINUED | OUTPATIENT
Start: 2021-05-26 | End: 2021-05-26 | Stop reason: HOSPADM

## 2021-05-26 RX ADMIN — TAMSULOSIN HYDROCHLORIDE 0.4 MG: 0.4 CAPSULE ORAL at 21:42

## 2021-05-26 RX ADMIN — ATORVASTATIN CALCIUM 80 MG: 40 TABLET, FILM COATED ORAL at 21:42

## 2021-05-26 RX ADMIN — GLYCOPYRROLATE 0.8 MG: 0.2 INJECTION, SOLUTION INTRAMUSCULAR; INTRAVENOUS at 15:06

## 2021-05-26 RX ADMIN — DEXAMETHASONE SODIUM PHOSPHATE 5 MG: 4 INJECTION, SOLUTION INTRAMUSCULAR; INTRAVENOUS at 15:15

## 2021-05-26 RX ADMIN — SODIUM CHLORIDE, SODIUM LACTATE, POTASSIUM CHLORIDE, AND CALCIUM CHLORIDE: 600; 310; 30; 20 INJECTION, SOLUTION INTRAVENOUS at 15:25

## 2021-05-26 RX ADMIN — HYDROMORPHONE HYDROCHLORIDE 0.5 MG: 2 INJECTION INTRAMUSCULAR; INTRAVENOUS; SUBCUTANEOUS at 16:45

## 2021-05-26 RX ADMIN — Medication 10 MG: at 13:57

## 2021-05-26 RX ADMIN — GLYCOPYRROLATE 0.2 MG: 0.2 INJECTION, SOLUTION INTRAMUSCULAR; INTRAVENOUS at 13:31

## 2021-05-26 RX ADMIN — DOCUSATE SODIUM 100 MG: 100 CAPSULE ORAL at 18:29

## 2021-05-26 RX ADMIN — DEXAMETHASONE SODIUM PHOSPHATE 10 MG: 4 INJECTION, SOLUTION INTRAMUSCULAR; INTRAVENOUS at 14:05

## 2021-05-26 RX ADMIN — BUPIVACAINE HYDROCHLORIDE AND EPINEPHRINE BITARTRATE 15 ML: 2.5; .005 INJECTION, SOLUTION EPIDURAL; INFILTRATION; INTRACAUDAL; PERINEURAL at 15:15

## 2021-05-26 RX ADMIN — CEFAZOLIN 2 G: 1 INJECTION, POWDER, FOR SOLUTION INTRAVENOUS at 13:30

## 2021-05-26 RX ADMIN — PROPOFOL 170 MG: 10 INJECTION, EMULSION INTRAVENOUS at 13:01

## 2021-05-26 RX ADMIN — PHENYLEPHRINE HYDROCHLORIDE 50 MCG: 10 INJECTION INTRAVENOUS at 14:13

## 2021-05-26 RX ADMIN — HYDROMORPHONE HYDROCHLORIDE 0.5 MG: 2 INJECTION INTRAMUSCULAR; INTRAVENOUS; SUBCUTANEOUS at 16:05

## 2021-05-26 RX ADMIN — ROCURONIUM BROMIDE 20 MG: 10 INJECTION, SOLUTION INTRAVENOUS at 13:42

## 2021-05-26 RX ADMIN — LEVETIRACETAM 1500 MG: 500 TABLET ORAL at 18:29

## 2021-05-26 RX ADMIN — LIDOCAINE HYDROCHLORIDE 1 MG/KG/HR: 8 INJECTION, SOLUTION INTRAVENOUS at 13:28

## 2021-05-26 RX ADMIN — SPIRONOLACTONE 25 MG: 25 TABLET ORAL at 18:29

## 2021-05-26 RX ADMIN — ONDANSETRON 4 MG: 2 INJECTION INTRAMUSCULAR; INTRAVENOUS at 14:05

## 2021-05-26 RX ADMIN — ROCURONIUM BROMIDE 50 MG: 10 INJECTION, SOLUTION INTRAVENOUS at 13:02

## 2021-05-26 RX ADMIN — ACETAMINOPHEN 1000 MG: 500 TABLET ORAL at 12:43

## 2021-05-26 RX ADMIN — DEXTROSE MONOHYDRATE AND SODIUM CHLORIDE 125 ML/HR: 5; .45 INJECTION, SOLUTION INTRAVENOUS at 18:28

## 2021-05-26 RX ADMIN — FENTANYL CITRATE 100 MCG: 50 INJECTION INTRAMUSCULAR; INTRAVENOUS at 13:00

## 2021-05-26 RX ADMIN — VECURONIUM BROMIDE 3 MG: 1 INJECTION, POWDER, LYOPHILIZED, FOR SOLUTION INTRAVENOUS at 14:14

## 2021-05-26 RX ADMIN — CEFAZOLIN SODIUM 1 G: 1 INJECTION, POWDER, FOR SOLUTION INTRAMUSCULAR; INTRAVENOUS at 21:42

## 2021-05-26 RX ADMIN — ACETAMINOPHEN 650 MG: 325 TABLET ORAL at 21:50

## 2021-05-26 RX ADMIN — SODIUM CHLORIDE, SODIUM LACTATE, POTASSIUM CHLORIDE, AND CALCIUM CHLORIDE 25 ML/HR: 600; 310; 30; 20 INJECTION, SOLUTION INTRAVENOUS at 12:44

## 2021-05-26 RX ADMIN — Medication 5 MG: at 15:06

## 2021-05-26 RX ADMIN — Medication 10 MG: at 13:21

## 2021-05-26 RX ADMIN — MANNITOL 12.5 G: 12.5 INJECTION, SOLUTION INTRAVENOUS at 14:29

## 2021-05-26 RX ADMIN — LIDOCAINE HYDROCHLORIDE 80 MG: 20 INJECTION, SOLUTION EPIDURAL; INFILTRATION; INTRACAUDAL; PERINEURAL at 13:00

## 2021-05-26 RX ADMIN — ONDANSETRON 4 MG: 2 INJECTION INTRAMUSCULAR; INTRAVENOUS at 19:48

## 2021-05-26 NOTE — PROGRESS NOTES
Patient arrived from PACU in stable condition. Patient AOx4. L flank dressing intact. Nolasco patent to gravity. 3L O2 via nc in place. IJ on L is saline locked. Patient oriented to the room with call light within reach and bed in low position.

## 2021-05-26 NOTE — BRIEF OP NOTE
Brief Postoperative Note    Patient: Jose Roberto Mills  YOB: 1950  MRN: [id number]    Date of Procedure: 5/26/2021     Pre-Op Diagnosis: Left renal mass [N28.89]    Post-Op Diagnosis: Same    Procedure(s):  LEFT PARTIAL NEPHRECTOMY LAPAROSCOPIC HAND ASSISTED; LEFT NEPHROPEXY    Surgeon(s):  Danii Hawk DO Odean Coad, MD    Surgical Assistant: None    Anesthesia: General     Estimated Blood Loss (mL): 22XH    Complications: none immediate    Specimens:   ID Type Source Tests Collected by Time Destination   1 : Left Renal Mass Fresh Kidney, Left  Beto Reed DO 5/26/2021 1432 Pathology        Implants: * No implants in log *    Drains: * No LDAs found *    Findings: see op note    Electronically Signed by Elliot Roach DO on 5/26/2021 at 3:15 PM

## 2021-05-26 NOTE — ANESTHESIA PROCEDURE NOTES
Peripheral Block    Start time: 5/26/2021 3:11 PM  End time: 5/26/2021 3:15 PM  Performed by: Brigido Rushing MD  Authorized by: Brigido Rushing MD       Pre-procedure:    Indications: at surgeon's request and post-op pain management    Preanesthetic Checklist: patient identified, risks and benefits discussed, site marked, timeout performed, anesthesia consent given and patient being monitored    Timeout Time: 15:11 EDT          Block Type:   Block Type:  TAP  Laterality:  Left  Monitoring:  Standard ASA monitoring, responsive to questions, continuous pulse ox, oxygen, frequent vital sign checks and heart rate  Injection Technique:  Single shot  Procedures: ultrasound guided and nerve stimulator    Patient Position: right lateral decubitus  Prep: chlorhexidine    Location:  Abdominal  Needle Type:  Stimuplex  Needle Gauge:  22 G  Needle Localization:  Ultrasound guidance and anatomical landmarks  Medication Injected:  Bupivacaine 0.25% -EPINEPHrine 1:200,000 (SENSORCAINE) mg injection, 15 mL  dexamethasone (DECADRON) 4 mg/mL injection, 5 mg  Med Admin Time: 5/26/2021 3:15 PM    Assessment:  Number of attempts:  1  Injection Assessment:  Incremental injection every 5 mL, negative aspiration for CSF, ultrasound image on chart, no paresthesia, local visualized surrounding nerve on ultrasound, negative aspiration for blood and no intravascular symptoms  Patient tolerance:  Patient tolerated the procedure well with no immediate complications

## 2021-05-26 NOTE — PERIOP NOTES
TRANSFER - OUT REPORT:    Verbal report given to LINO Kevin on [facility]  being transferred to [address] [place] [address] for routine post - op       Report consisted of patients Situation, Background, Assessment and   Recommendations(SBAR). Information from the following report(s) OR Summary, Procedure Summary, Intake/Output and MAR was reviewed with the receiving nurse. Lines:   Peripheral IV 05/26/21 Anterior; Left External jugular (Active)   Site Assessment Clean, dry, & intact 05/26/21 1602   Phlebitis Assessment 0 05/26/21 1602   Infiltration Assessment 0 05/26/21 1602   Dressing Status Clean, dry, & intact 05/26/21 1602   Dressing Type Tape;Transparent 05/26/21 1602   Hub Color/Line Status Infusing;Patent 05/26/21 1602        Opportunity for questions and clarification was provided. Patient transported with:   O2 @ 3 liters    VTE prophylaxis orders have been written for [facility]. Patient and family given floor number and nurses name.

## 2021-05-26 NOTE — ANESTHESIA PREPROCEDURE EVALUATION
Relevant Problems   No relevant active problems       Anesthetic History   No history of anesthetic complications            Review of Systems / Medical History  Patient summary reviewed and pertinent labs reviewed    Pulmonary        Sleep apnea  Undiagnosed apnea         Neuro/Psych     seizures (Keppra and Dilantin ): well controlled  CVA: no residual symptoms      Comments: H/O cervical stenosis    Seizures after CVA in 2015 Cardiovascular    Hypertension: well controlled          Hyperlipidemia    Exercise tolerance: >4 METS  Comments: Denies CP, SOB or changes in functional status   GI/Hepatic/Renal     GERD: well controlled    Renal disease: CRI  PUD    Comments: Right renal mass Endo/Other        Obesity and arthritis     Other Findings   Comments: Gouty arthritis           Physical Exam    Airway  Mallampati: II  TM Distance: > 6 cm  Neck ROM: normal range of motion   Mouth opening: Normal     Cardiovascular  Regular rate and rhythm,  S1 and S2 normal,  no murmur, click, rub, or gallop  Rhythm: regular  Rate: normal         Dental    Dentition: Full upper dentures     Pulmonary  Breath sounds clear to auscultation               Abdominal  GI exam deferred       Other Findings            Anesthetic Plan    ASA: 3  Anesthesia type: general    Monitoring Plan: Arterial line      Induction: Intravenous  Anesthetic plan and risks discussed with: Patient

## 2021-05-26 NOTE — PROGRESS NOTES
TRANSFER - IN REPORT:    Verbal report received from Melody(name) on Marychuy Peralta  being received from [employer]) for routine progression of care      Report consisted of patients Situation, Background, Assessment and   Recommendations(SBAR). Information from the following report(s) SBAR was reviewed with the receiving nurse. Opportunity for questions and clarification was provided. Assessment completed upon patients arrival to unit and care assumed.

## 2021-05-26 NOTE — PROGRESS NOTES
's visit attempted in Pre-op for a pre-surgical visit. Mr. Paula Heck went to surgery prior to my arrival in Pre-op. I was unable to locate family in the surgery waiting room for a visit. Chaplains remain available as needed.      Lindsey More 68  Board Certified

## 2021-05-26 NOTE — ANESTHESIA POSTPROCEDURE EVALUATION
Procedure(s):  LEFT PARTIAL NEPHRECTOMY LAPAROSCOPIC HAND ASSISTED. general    Anesthesia Post Evaluation      Multimodal analgesia: multimodal analgesia used between 6 hours prior to anesthesia start to PACU discharge  Patient location during evaluation: bedside  Patient participation: complete - patient participated  Level of consciousness: awake and alert  Pain score: 2  Pain management: adequate  Airway patency: patent  Anesthetic complications: no  Cardiovascular status: acceptable  Respiratory status: acceptable  Hydration status: acceptable  Comments: Patient doing well. Continue care on floor.    Post anesthesia nausea and vomiting:  none  Final Post Anesthesia Temperature Assessment:  Normothermia (36.0-37.5 degrees C)      INITIAL Post-op Vital signs:   Vitals Value Taken Time   /70 05/26/21 1602   Temp 36.4 °C (97.6 °F) 05/26/21 1602   Pulse 52 05/26/21 1602   Resp 16 05/26/21 1602   SpO2 98 % 05/26/21 1602

## 2021-05-27 LAB
ANION GAP SERPL CALC-SCNC: 7 MMOL/L (ref 7–16)
BUN SERPL-MCNC: 17 MG/DL (ref 8–23)
CALCIUM SERPL-MCNC: 9.3 MG/DL (ref 8.3–10.4)
CHLORIDE SERPL-SCNC: 102 MMOL/L (ref 98–107)
CO2 SERPL-SCNC: 28 MMOL/L (ref 21–32)
CREAT SERPL-MCNC: 1.16 MG/DL (ref 0.8–1.5)
GLUCOSE SERPL-MCNC: 152 MG/DL (ref 65–100)
HCT VFR BLD AUTO: 38.5 % (ref 41.1–50.3)
HGB BLD-MCNC: 12.5 G/DL (ref 13.6–17.2)
POTASSIUM SERPL-SCNC: 4.3 MMOL/L (ref 3.5–5.1)
SODIUM SERPL-SCNC: 137 MMOL/L (ref 136–145)

## 2021-05-27 PROCEDURE — 36415 COLL VENOUS BLD VENIPUNCTURE: CPT

## 2021-05-27 PROCEDURE — 74011000258 HC RX REV CODE- 258: Performed by: UROLOGY

## 2021-05-27 PROCEDURE — 77030027138 HC INCENT SPIROMETER -A

## 2021-05-27 PROCEDURE — 77010033678 HC OXYGEN DAILY

## 2021-05-27 PROCEDURE — 94760 N-INVAS EAR/PLS OXIMETRY 1: CPT

## 2021-05-27 PROCEDURE — 74011250637 HC RX REV CODE- 250/637: Performed by: UROLOGY

## 2021-05-27 PROCEDURE — 74011250636 HC RX REV CODE- 250/636: Performed by: UROLOGY

## 2021-05-27 PROCEDURE — 85018 HEMOGLOBIN: CPT

## 2021-05-27 PROCEDURE — 2709999900 HC NON-CHARGEABLE SUPPLY

## 2021-05-27 PROCEDURE — 65270000029 HC RM PRIVATE

## 2021-05-27 PROCEDURE — 80048 BASIC METABOLIC PNL TOTAL CA: CPT

## 2021-05-27 PROCEDURE — 74011000250 HC RX REV CODE- 250: Performed by: UROLOGY

## 2021-05-27 RX ADMIN — DEXTROSE MONOHYDRATE AND SODIUM CHLORIDE 125 ML/HR: 5; .45 INJECTION, SOLUTION INTRAVENOUS at 14:44

## 2021-05-27 RX ADMIN — CEFAZOLIN SODIUM 1 G: 1 INJECTION, POWDER, FOR SOLUTION INTRAMUSCULAR; INTRAVENOUS at 14:45

## 2021-05-27 RX ADMIN — ONDANSETRON 4 MG: 2 INJECTION INTRAMUSCULAR; INTRAVENOUS at 00:31

## 2021-05-27 RX ADMIN — HYDROCODONE BITARTRATE AND ACETAMINOPHEN 1 TABLET: 5; 325 TABLET ORAL at 00:31

## 2021-05-27 RX ADMIN — CEFAZOLIN SODIUM 1 G: 1 INJECTION, POWDER, FOR SOLUTION INTRAMUSCULAR; INTRAVENOUS at 05:05

## 2021-05-27 RX ADMIN — HYDROCODONE BITARTRATE AND ACETAMINOPHEN 1 TABLET: 5; 325 TABLET ORAL at 09:06

## 2021-05-27 RX ADMIN — DOCUSATE SODIUM 100 MG: 100 CAPSULE ORAL at 18:01

## 2021-05-27 RX ADMIN — DOCUSATE SODIUM 100 MG: 100 CAPSULE ORAL at 09:06

## 2021-05-27 RX ADMIN — PHENYTOIN SODIUM 300 MG: 100 CAPSULE ORAL at 09:07

## 2021-05-27 RX ADMIN — ONDANSETRON 4 MG: 2 INJECTION INTRAMUSCULAR; INTRAVENOUS at 05:05

## 2021-05-27 RX ADMIN — LEVETIRACETAM 1500 MG: 500 TABLET ORAL at 18:01

## 2021-05-27 RX ADMIN — ATORVASTATIN CALCIUM 80 MG: 40 TABLET, FILM COATED ORAL at 21:46

## 2021-05-27 RX ADMIN — DEXTROSE MONOHYDRATE AND SODIUM CHLORIDE 125 ML/HR: 5; .45 INJECTION, SOLUTION INTRAVENOUS at 04:05

## 2021-05-27 RX ADMIN — ATENOLOL 100 MG: 50 TABLET ORAL at 09:06

## 2021-05-27 RX ADMIN — ALLOPURINOL 300 MG: 300 TABLET ORAL at 09:06

## 2021-05-27 RX ADMIN — TAMSULOSIN HYDROCHLORIDE 0.4 MG: 0.4 CAPSULE ORAL at 21:46

## 2021-05-27 RX ADMIN — POTASSIUM CHLORIDE 20 MEQ: 20 TABLET, EXTENDED RELEASE ORAL at 09:06

## 2021-05-27 RX ADMIN — AMLODIPINE BESYLATE 10 MG: 10 TABLET ORAL at 09:06

## 2021-05-27 RX ADMIN — LEVETIRACETAM 1500 MG: 500 TABLET ORAL at 09:05

## 2021-05-27 RX ADMIN — ACETAMINOPHEN 650 MG: 325 TABLET ORAL at 21:51

## 2021-05-27 NOTE — OP NOTES
[facility]  OPERATIVE REPORT    Name:  Marimar Cavazos  MR#:  [id number]  :  1950  ACCOUNT #:  [de-identified]  DATE OF SERVICE:  2021    PREOPERATIVE DIAGNOSIS:  Left renal mass. POSTOPERATIVE DIAGNOSIS:  Left renal mass. PROCEDURE PERFORMED:  Left hand-assisted laparoscopic partial nephrectomy and left laparoscopic nephropexy. SURGEON:  Collette Padilla DO    ASSISTANT:  Dr. Roberts Cutter:  General.    COMPLICATIONS:  None immediate. SPECIMENS REMOVED:  Renal mass. IMPLANTS:  None. ESTIMATED BLOOD LOSS:  25 mL. CLINICAL HISTORY:  This is a 70-year-old gentleman who was recently found to have an enlarging left mid pole renal mass after undergoing surveillance imaging. All risks, benefits and alternatives to the above-mentioned procedure have been discussed and he is willing to proceed at this time. DESCRIPTION OF PROCEDURE:  Patient consent was obtained. The patient was brought back to the operating room at which time he was placed in the supine position. After the uneventful induction of general anesthesia, a Nolasco catheter was placed to dependent drainage. The patient was then placed in a modified left lateral decubitus position. All pressure points were carefully padded and the patient was secured to the table. The patient's abdominal area was prepped and draped and a sterile field applied. A small left upper quadrant anterior subcostal incision was made. This was carried down to the peritoneal cavity without event. The GelPort was assembled and the abdomen was insufflated with CO2. Two separate 12 mm ports were then placed in the patient's left lower quadrant under direct vision. Using the harmonic scalpel the white line of Toldt was incised and the colon was reflected medially. The lower pole of the kidney was identified. The ureter and gonadal vein were both identified and dissected out.   These were used as a handle to aid in proximal dissection. Dissection then carried up to the renal hilum where a separate renal artery and vein were identified and dissected out. The lateral and posterior attachments of the kidney were taken down sharply using the harmonic scalpel. Majority of the upper pole attachments were left in place. The kidney was then defatted at the mid pole. The mass was identified. A laparoscopic ultrasound was then performed. This revealed a solid mass measuring slightly over 2 cm in greatest dimension. Renal cysts were visualized in the upper pole and lower pole consistent with his CT findings. After defatting the mid pole of the left kidney, the renal hilum was inspected and further dissected out. 12.5 g of mannitol was given intravenously. The renal hilum was then clamped using a laparoscopic bulldog clamp. A single clip was used to occlude flow from the renal artery and renal vein. The renal mass was then excised using laparoscopic scissors. The mass was then handed off to the back table as specimen. The base of the defect was then fulgurated using the argon beam coagulator. A small Surgicel bolster was then placed in the defect and the renal parenchyma was reapproximated using 0 Vicryl suture in a simple interrupted fashion. The laparoscopic bulldog clamp was then removed. Total warm ischemia time was 11 minutes. Another 0 Vicryl suture was placed at the superior portion of the defect. The intra-abdominal pressures were lowered. No active bleeding was seen. 5 mL of FloSeal was placed over the defect and a piece of Surgicel was placed on top of the FloSeal.  Following complete hemostasis, the intra-abdominal pressures were raised. The kidney was returned to its normal orthotopic position. Nephropexy was performed using two separate 2-0 Vicryl sutures in a simple interrupted fashion affixing the lateral portion of the kidney to the left lateral abdominal wall. The abdomen was then surveyed.   This plane appeared unharmed. There was no active bleeding. The colon also appeared unharmed. The two 12-mm ports were then placed using 0 Vicryl on an Endoclose device under direct vision. All ports were then removed under direct vision. The GelPort incision was closed using two layers. The first layer incorporated the peritoneum and transversalis fascial layers. This was closed using 0 Vicryl in a running fashion. The second layer incorporated the internal and external oblique fascial layers. This was closed using #1 PDS in a running fashion. All skin incisions were then closed using skin staples. The patient tolerated the procedure well. Estimated blood loss was 25 mL.         [address],[facility], DO      [id number]  D:  05/26/2021 15:28  T:  05/27/2021 2:10  JOB #:  [id number]

## 2021-05-27 NOTE — PROGRESS NOTES
Patient lying in bed watching TV. No distress or pain noted. Infusion stopped at this time. Hourly rounding completed. Bed in low position. Belongings and call light within reach. Will give report to night shift nurse.

## 2021-05-27 NOTE — PROGRESS NOTES
Patient complains of pain to IV site with fluids infusing. Infusion stopped. New IV inserted to left AC. Patient now complains of pain to left AC IV site. IV removed. IV catheter intact. No active bleeding noted. Outreach nurse contacted for assist with another IV.

## 2021-05-27 NOTE — PROGRESS NOTES
Admit Date: 5/26/2021    Subjective:     Radha Pretty is resting. Tolerating clears. Has not passed flatus. Pain controlled. Nolasco in place- urine clear/yellow. Objective:     Patient Vitals for the past 8 hrs:   BP Temp Pulse Resp SpO2 Weight   05/27/21 0906 -- -- 64 -- -- --   05/27/21 0747 (!) 145/87 98.8 °F (37.1 °C) (!) 59 15 99 % --   05/27/21 0606 -- -- -- -- -- 252 lb 9.6 oz (114.6 kg)   05/27/21 0501 (!) 140/80 97.8 °F (36.6 °C) 61 16 100 % --     No intake/output data recorded.   05/25 1901 - 05/27 0700  In: -   Out: Rosalia [Urine:1250]    Physical Exam:  GENERAL: alert, cooperative, no distress  LUNG: clear to auscultation bilaterally  HEART: regular rate and rhythm, S1, S2   ABDOMEN: soft, non-tender, dressing c/d/i  NEUROLOGIC: AOx3    Data Review   Recent Results (from the past 24 hour(s))   TYPE & SCREEN    Collection Time: 05/26/21 12:29 PM   Result Value Ref Range    Crossmatch Expiration 05/29/2021,2359     ABO/Rh(D) O POSITIVE     Antibody screen NEG     Unit number [id number]     Blood component type [facility]     Unit division 00     Status of unit ALLOCATED     Crossmatch result Compatible     Unit number [id number]     Blood component type [facility]     Unit division 00     Status of unit ALLOCATED     Crossmatch result Compatible    HGB & HCT    Collection Time: 05/26/21  3:54 PM   Result Value Ref Range    HGB 10.2 (L) 13.6 - 17.2 g/dL    HCT 31.1 (L) 41.1 - 32.9 %   METABOLIC PANEL, BASIC    Collection Time: 05/27/21  5:34 AM   Result Value Ref Range    Sodium 137 136 - 145 mmol/L    Potassium 4.3 3.5 - 5.1 mmol/L    Chloride 102 98 - 107 mmol/L    CO2 28 21 - 32 mmol/L    Anion gap 7 7 - 16 mmol/L    Glucose 152 (H) 65 - 100 mg/dL    BUN 17 8 - 23 MG/DL    Creatinine 1.16 0.8 - 1.5 MG/DL    GFR est AA >60 >60 ml/min/1.73m2    GFR est non-AA >60 >60 ml/min/1.73m2    Calcium 9.3 8.3 - 10.4 MG/DL   HGB & HCT    Collection Time: 05/27/21  5:34 AM   Result Value Ref Range    HGB 12.5 (L) 13.6 - 17.2 g/dL    HCT 38.5 (L) 41.1 - 50.3 %       Assessment:     Active Problems:    Renal mass (10/17/2019)      POD 1:    POSTOPERATIVE DIAGNOSIS:  Left renal mass. PROCEDURE PERFORMED:  Left hand-assisted laparoscopic partial nephrectomy and left laparoscopic nephropexy. Afebrile, VSS  Hgb 12.5  Cn 1.16    Plan:     Ambulate pt. Continue clears. Advance diet with flatus. Continue IS use every hour. Continue acuña. Prince Bojorquez NP  [facility]    I have reviewed the above note and examined the patient. I agree with the exam, assessment and plan. Denies flatus. AFVSS. Abd soft, ND. Labs reviewed. UOP stable and clear. S/P L HALPN POD#1. Ambulate. Await flatus to advance diet.     Heber Hawk, DO

## 2021-05-27 NOTE — OP NOTES
[facility]  OPERATIVE REPORT    Name:  Miles Conner  MR#:  [id number]  :  1950  ACCOUNT #:  [de-identified]  DATE OF SERVICE:  2021    PREOPERATIVE DIAGNOSIS:  Renal mass. POSTOPERATIVE DIAGNOSIS:  Renal mass. PROCEDURE PERFORMED:  Hand-assisted left partial nephrectomy. SURGEON:  Helen Patrick DO    ASSISTANT:  Raiza Maria. Leighann Cole MD    ANESTHESIA: General    Please note that I assisted Dr. Rosie Estrella with this case and was present from start to finish.       Rogers Pepper MD      [id number]  D:  2021 21:04  T:  2021 2:10  JOB #:  [id number]

## 2021-05-27 NOTE — PROGRESS NOTES
CM met with patient to complete assessment. Patient presented alert and oriented. Demographic information verified by the patient. The patient lives with his spouse Renea Farley [phone] in a split level home with 2 steps at the entrance. Patient confirmed she is independent with completing all ADL's and drives. The patient does not drive at night. DME: Mike Lion used as needed. Patient obtains her prescription medications from [facility] in [facility]. Patient voiced no difficulty with obtaining medications in the community. Discharge planning: PT/OT has not been consulted. Patient denies any history of HH or REHAB. Patient anticipates to return home when medically stable. No needs voiced at this time. Please consult or notify CM if any needs shall arise. CM remains available. Care Management Interventions  PCP Verified by CM: Yes  Mode of Transport at Discharge: Other (see comment) (Spouse- Natalie [phone].)  Transition of Care Consult (CM Consult): Discharge Planning  Discharge Durable Medical Equipment: No  Physical Therapy Consult: No  Occupational Therapy Consult: No  Speech Therapy Consult: No  Current Support Network: Lives with Spouse, Own Home  Confirm Follow Up Transport: Self  Name of the Patient Representative Who was Provided with a Choice of Provider and Agrees with the Discharge Plan: Patient.    [place] Resource Information Provided?: No  Discharge Location  Discharge Placement: Home

## 2021-05-27 NOTE — PROGRESS NOTES
Pt resting in bed with TV on. Only complaint this shift of acid reflux addressed with MD Vu Dean. Hourly rounds completed this shift. Bed low/locked, side rails up x2 with call light in reach. Will continue to monitor and report to oncoming shift.

## 2021-05-27 NOTE — PROGRESS NOTES
Problem: Falls - Risk of  Goal: *Absence of Falls  Description: Document [place]y Fall Risk and appropriate interventions in the flowsheet.   Outcome: Progressing Towards Goal  Note: Fall Risk Interventions:  Mobility Interventions: Communicate number of staff needed for ambulation/transfer, Patient to call before getting OOB         Medication Interventions: Teach patient to arise slowly, Patient to call before getting OOB, Evaluate medications/consider consulting pharmacy                   Problem: Patient Education: Go to Patient Education Activity  Goal: Patient/Family Education  Outcome: Progressing Towards Goal

## 2021-05-28 PROCEDURE — 74011000250 HC RX REV CODE- 250: Performed by: UROLOGY

## 2021-05-28 PROCEDURE — 77010033678 HC OXYGEN DAILY

## 2021-05-28 PROCEDURE — 74011250636 HC RX REV CODE- 250/636: Performed by: UROLOGY

## 2021-05-28 PROCEDURE — 2709999900 HC NON-CHARGEABLE SUPPLY

## 2021-05-28 PROCEDURE — 74011250637 HC RX REV CODE- 250/637: Performed by: UROLOGY

## 2021-05-28 PROCEDURE — 99024 POSTOP FOLLOW-UP VISIT: CPT | Performed by: UROLOGY

## 2021-05-28 PROCEDURE — 65270000029 HC RM PRIVATE

## 2021-05-28 PROCEDURE — 94760 N-INVAS EAR/PLS OXIMETRY 1: CPT

## 2021-05-28 RX ADMIN — DIPHENHYDRAMINE HYDROCHLORIDE 12.5 MG: 50 INJECTION, SOLUTION INTRAMUSCULAR; INTRAVENOUS at 22:33

## 2021-05-28 RX ADMIN — POTASSIUM CHLORIDE 20 MEQ: 20 TABLET, EXTENDED RELEASE ORAL at 09:50

## 2021-05-28 RX ADMIN — DOCUSATE SODIUM 100 MG: 100 CAPSULE ORAL at 17:30

## 2021-05-28 RX ADMIN — LEVETIRACETAM 1500 MG: 500 TABLET ORAL at 17:30

## 2021-05-28 RX ADMIN — DEXTROSE MONOHYDRATE AND SODIUM CHLORIDE 125 ML/HR: 5; .45 INJECTION, SOLUTION INTRAVENOUS at 16:28

## 2021-05-28 RX ADMIN — PHENYTOIN SODIUM 300 MG: 100 CAPSULE ORAL at 09:50

## 2021-05-28 RX ADMIN — TAMSULOSIN HYDROCHLORIDE 0.4 MG: 0.4 CAPSULE ORAL at 22:24

## 2021-05-28 RX ADMIN — ALLOPURINOL 300 MG: 300 TABLET ORAL at 09:50

## 2021-05-28 RX ADMIN — HYDROCODONE BITARTRATE AND ACETAMINOPHEN 1 TABLET: 5; 325 TABLET ORAL at 12:18

## 2021-05-28 RX ADMIN — ATENOLOL 100 MG: 50 TABLET ORAL at 09:50

## 2021-05-28 RX ADMIN — LEVETIRACETAM 1500 MG: 500 TABLET ORAL at 09:50

## 2021-05-28 RX ADMIN — ATORVASTATIN CALCIUM 80 MG: 40 TABLET, FILM COATED ORAL at 22:25

## 2021-05-28 RX ADMIN — AMLODIPINE BESYLATE 10 MG: 10 TABLET ORAL at 09:50

## 2021-05-28 RX ADMIN — DEXTROSE MONOHYDRATE AND SODIUM CHLORIDE 125 ML/HR: 5; .45 INJECTION, SOLUTION INTRAVENOUS at 08:06

## 2021-05-28 RX ADMIN — DOCUSATE SODIUM 100 MG: 100 CAPSULE ORAL at 09:50

## 2021-05-28 NOTE — PROGRESS NOTES
Hourly rounds completed throughout this shift. Pt has voided since his acuña catheter was removed. Pt resting in bed; denies needs at this time. Will continue to monitor and report to oncoming night shift nurse.

## 2021-05-28 NOTE — PROGRESS NOTES
Admit Date: 5/26/2021    Subjective:     Leandro Luis is resting. Tolerating clears. No flatus. Acuña in place- urine clear. Objective:     Patient Vitals for the past 8 hrs:   BP Temp Pulse Resp SpO2   05/28/21 0953 -- -- -- -- 94 %   05/28/21 0829 (!) 154/79 98.2 °F (36.8 °C) 68 18 92 %   05/28/21 0405 (!) 161/80 98.2 °F (36.8 °C) 83 15 92 %     No intake/output data recorded. 05/26 1901 - 05/28 0700  In: 1320 [P.O.:1320]  Out: 3000 [Urine:3000]    Physical Exam:  GENERAL: alert, cooperative, no distress  LUNG: clear to auscultation bilaterally  HEART: regular rate and rhythm, S1, S2  ABDOMEN: soft, non-tender, dressing c/d/i  NEUROLOGIC: AOx3    Data Review No results found for this or any previous visit (from the past 24 hour(s)). Assessment:     Active Problems:    Renal mass (10/17/2019)      POD 2:     POSTOPERATIVE DIAGNOSIS:  Left renal mass. PROCEDURE PERFORMED:  Left hand-assisted laparoscopic partial nephrectomy and left laparoscopic nephropexy. Afebrile, VSS    Plan:     Await flatus to advance diet. Ambulate pt. Remove acuña catheter and monitor voiding. Continue IS use q hour. Needs follow up in 7-10 days for staple removal, office will call with date/time. Gene Alvarez NP  [facility]    I have reviewed the above note and examined the patient. I agree with the exam, assessment and plan. Denies flatus. AFVSS. Abd soft. Ports c/d/I. No new labs. Path pending. S/P L HALPN POD#1. VT today. Await flatus to advance diet.     Damaris Hawk,

## 2021-05-28 NOTE — PROGRESS NOTES
Mr. James Rivers has not passed flatus. He continues to ambulate. He is voiding sp acuña removal.      Plan: Advance diet with flatus, ambulate, continue IS.

## 2021-05-28 NOTE — PROGRESS NOTES
Pt's acuña catheter removed. Pt tolerated well. Handed pt clean urinal for voiding and will continue to monitor.

## 2021-05-28 NOTE — PROGRESS NOTES
Pt resting in bed. No complaints this shift. New IV site established per [employer]. Hourly rounds completed. Bed low/locked, side rails up x2 with call bell in reach. Will continue to monitor and report to oncoming shift.

## 2021-05-29 PROCEDURE — 74011250637 HC RX REV CODE- 250/637: Performed by: UROLOGY

## 2021-05-29 PROCEDURE — 74011000250 HC RX REV CODE- 250: Performed by: UROLOGY

## 2021-05-29 PROCEDURE — 2709999900 HC NON-CHARGEABLE SUPPLY

## 2021-05-29 PROCEDURE — 65270000029 HC RM PRIVATE

## 2021-05-29 RX ORDER — FACIAL-BODY WIPES
10 EACH TOPICAL DAILY
Status: DISCONTINUED | OUTPATIENT
Start: 2021-05-29 | End: 2021-05-30 | Stop reason: HOSPADM

## 2021-05-29 RX ADMIN — DEXTROSE MONOHYDRATE AND SODIUM CHLORIDE 125 ML/HR: 5; .45 INJECTION, SOLUTION INTRAVENOUS at 10:29

## 2021-05-29 RX ADMIN — ALLOPURINOL 300 MG: 300 TABLET ORAL at 09:32

## 2021-05-29 RX ADMIN — POTASSIUM CHLORIDE 20 MEQ: 20 TABLET, EXTENDED RELEASE ORAL at 09:32

## 2021-05-29 RX ADMIN — BISACODYL 10 MG: 10 SUPPOSITORY RECTAL at 12:55

## 2021-05-29 RX ADMIN — ATORVASTATIN CALCIUM 80 MG: 40 TABLET, FILM COATED ORAL at 20:53

## 2021-05-29 RX ADMIN — DOCUSATE SODIUM 100 MG: 100 CAPSULE ORAL at 17:37

## 2021-05-29 RX ADMIN — TAMSULOSIN HYDROCHLORIDE 0.4 MG: 0.4 CAPSULE ORAL at 20:54

## 2021-05-29 RX ADMIN — ATENOLOL 100 MG: 50 TABLET ORAL at 09:32

## 2021-05-29 RX ADMIN — PHENYTOIN SODIUM 300 MG: 100 CAPSULE ORAL at 09:32

## 2021-05-29 RX ADMIN — DOCUSATE SODIUM 100 MG: 100 CAPSULE ORAL at 09:32

## 2021-05-29 RX ADMIN — LEVETIRACETAM 1500 MG: 500 TABLET ORAL at 17:37

## 2021-05-29 RX ADMIN — DEXTROSE MONOHYDRATE AND SODIUM CHLORIDE 125 ML/HR: 5; .45 INJECTION, SOLUTION INTRAVENOUS at 01:09

## 2021-05-29 RX ADMIN — LEVETIRACETAM 1500 MG: 500 TABLET ORAL at 09:32

## 2021-05-29 RX ADMIN — AMLODIPINE BESYLATE 10 MG: 10 TABLET ORAL at 09:32

## 2021-05-29 NOTE — PROGRESS NOTES
Hourly rounds completed throughout this shift. Pt reported having 2 small bm this shift after suppository. Pt resting in bed; denies needs at this time. Will continue to monitor and report to oncoming night shift nurse.

## 2021-05-29 NOTE — PROGRESS NOTES
Subjective:   Daily Progress Note: 2021 11:16 AM  No Complaints. Hasn't passed flatus. Objective:     Visit Vitals  BP (!) 149/80   Pulse 63   Temp 98.1 °F (36.7 °C)   Resp 12   Ht 6' 2\" (1.88 m)   Wt 252 lb 9.6 oz (114.6 kg)   SpO2 92%   BMI 32.43 kg/m²    O2 Flow Rate (L/min): 3 l/min O2 Device: None (Room air)    Temp (24hrs), Av.5 °F (36.9 °C), Min:98.1 °F (36.7 °C), Max:99.2 °F (37.3 °C)       1901 -  0700  In: 960 [P.O.:960]  Out: 5600 [Urine:5600]  No intake/output data recorded. [unfilled]  [unfilled]  [unfilled]    Exam: abdomen soft, nontender. Data Review    No results found for this or any previous visit (from the past 24 hour(s)). Assessment   Active Problems:    Renal mass (10/17/2019)        Plan:  Postop ileus . will give Dulcolax suppos, encourage ambulation. Home when passes flatus.

## 2021-05-29 NOTE — PROGRESS NOTES
Problem: Falls - Risk of  Goal: *Absence of Falls  Description: Document Nerirell Canavan Fall Risk and appropriate interventions in the flowsheet.   Outcome: Progressing Towards Goal  Note: Fall Risk Interventions:  Mobility Interventions: Patient to call before getting OOB         Medication Interventions: Patient to call before getting OOB                   Problem: Patient Education: Go to Patient Education Activity  Goal: Patient/Family Education  Outcome: Progressing Towards Goal

## 2021-05-29 NOTE — PROGRESS NOTES
Assessment and hourly rounds  completed , patient voided x 3 during shift prn benadryl administered  for itching patient has dry flaky skin lotion applied per patient. Bed locked low call light within reach. Will give bedside report to on coming RN which will consist of  Situation, Background, Assessment and recommendations(SBAR). Opportunity for questions and clarification will be  provided. Date 05/28/21 0700 - 05/29/21 0659 05/29/21 0700 - 05/30/21 0659   Shift 5086-7862 0689-9860 24 Hour Total 6257-7946 5917-4716 24 Hour Total   INTAKE   P.O. 960  960        P. O. 960  960      Shift Total(mL/kg) 960(8.4)  960(8.4)      OUTPUT   Urine(mL/kg/hr) 2600(1.9) 1500 4100        Urine Voided 700 1500 2200        Urine Output (mL) ([REMOVED] Urinary Catheter 05/26/21 2- way) 1900  1900      Shift Total(mL/kg) 4540(59.8) 1500(13.1) 4100(35.8)      NET -1852 -4179 -6035      Weight (kg) 114.6 114.6 114.6 114.6 114.6 114.6

## 2021-05-30 VITALS
BODY MASS INDEX: 32.42 KG/M2 | TEMPERATURE: 98.6 F | WEIGHT: 252.6 LBS | OXYGEN SATURATION: 97 % | DIASTOLIC BLOOD PRESSURE: 78 MMHG | RESPIRATION RATE: 14 BRPM | HEIGHT: 74 IN | SYSTOLIC BLOOD PRESSURE: 130 MMHG | HEART RATE: 62 BPM

## 2021-05-30 LAB
ABO + RH BLD: NORMAL
BLD PROD TYP BPU: NORMAL
BLD PROD TYP BPU: NORMAL
BLOOD GROUP ANTIBODIES SERPL: NORMAL
BPU ID: NORMAL
BPU ID: NORMAL
CROSSMATCH RESULT,%XM: NORMAL
CROSSMATCH RESULT,%XM: NORMAL
SPECIMEN EXP DATE BLD: NORMAL
STATUS OF UNIT,%ST: NORMAL
STATUS OF UNIT,%ST: NORMAL
UNIT DIVISION, %UDIV: 0
UNIT DIVISION, %UDIV: 0

## 2021-05-30 PROCEDURE — 74011250637 HC RX REV CODE- 250/637: Performed by: UROLOGY

## 2021-05-30 PROCEDURE — 99024 POSTOP FOLLOW-UP VISIT: CPT | Performed by: UROLOGY

## 2021-05-30 RX ORDER — HYDROCODONE BITARTRATE AND ACETAMINOPHEN 5; 325 MG/1; MG/1
1 TABLET ORAL
Qty: 20 TABLET | Refills: 0 | Status: SHIPPED | OUTPATIENT
Start: 2021-05-30 | End: 2021-06-06

## 2021-05-30 RX ADMIN — LEVETIRACETAM 1500 MG: 500 TABLET ORAL at 09:35

## 2021-05-30 RX ADMIN — DOCUSATE SODIUM 100 MG: 100 CAPSULE ORAL at 09:34

## 2021-05-30 RX ADMIN — ALLOPURINOL 300 MG: 300 TABLET ORAL at 09:36

## 2021-05-30 RX ADMIN — ATENOLOL 100 MG: 50 TABLET ORAL at 09:36

## 2021-05-30 RX ADMIN — AMLODIPINE BESYLATE 10 MG: 10 TABLET ORAL at 09:37

## 2021-05-30 RX ADMIN — PHENYTOIN SODIUM 300 MG: 100 CAPSULE ORAL at 09:34

## 2021-05-30 RX ADMIN — BISACODYL 10 MG: 10 SUPPOSITORY RECTAL at 09:38

## 2021-05-30 RX ADMIN — POTASSIUM CHLORIDE 20 MEQ: 20 TABLET, EXTENDED RELEASE ORAL at 09:36

## 2021-05-30 NOTE — PROGRESS NOTES
Discharge teaching gone over with patient and wife. Both verbalize understanding. No distress noted. Patient taken down to family vehicle via wheelchair.

## 2021-05-30 NOTE — PROGRESS NOTES
Pt is for discharge home today with no needs/supportive care orders recieved for CM at this time. Care Management Interventions  PCP Verified by CM: Yes  Mode of Transport at Discharge: Other (see comment) (Spouse- Natalie [phone].)  Transition of Care Consult (CM Consult): Discharge Planning  Discharge Durable Medical Equipment: No  Physical Therapy Consult: No  Occupational Therapy Consult: No  Speech Therapy Consult: No  Current Support Network: Lives with Spouse, Own Home  Confirm Follow Up Transport: Self  Name of the Patient Representative Who was Provided with a Choice of Provider and Agrees with the Discharge Plan: Patient.     Resource Information Provided?: No  Discharge Location  Discharge Placement: Home

## 2021-05-30 NOTE — DISCHARGE INSTRUCTIONS
Patient Education        Laparoscopic Nephrectomy: What to Expect at Home  Your Recovery  A nephrectomy is surgery to take out part or all of the kidney. One or both kidneys may be taken out. Sometimes other tissue near the kidney is taken out at the same time. Your belly will feel sore after the surgery. This usually lasts about 1 to 2 weeks. Your doctor will give you pain medicine for this. You may also have other symptoms such as nausea, diarrhea, constipation, gas, or a headache. At first, you may have low energy and get tired quickly. It may take 3 to 6 months for your energy to fully return. Your body can work fine with one healthy kidney. If both kidneys are removed or your remaining kidney is not healthy, your doctor will talk to you about the kind of treatment you will need after surgery. This care sheet gives you a general idea about how long it will take for you to recover. But each person recovers at a different pace. Follow the steps below to get better as quickly as possible. How can you care for yourself at home? Activity    · Rest when you feel tired. Getting enough sleep will help you recover.     · Try to walk each day. Start by walking a little more than you did the day before. Bit by bit, increase the amount you walk. Walking boosts blood flow and helps prevent pneumonia and constipation.     · Avoid exercises that use your belly muscles and strenuous activities such as bicycle riding, jogging, weight lifting, or aerobic exercise until your doctor says it is okay.     · For at least 4 weeks, avoid lifting anything that would make you strain. This may include a child, heavy grocery bags and milk containers, a heavy briefcase or backpack, cat litter or dog food bags, or a vacuum .     · Hold a pillow over the cuts the doctor made (incisions) when you cough or take deep breaths.  This will support your belly and decrease your pain.     · Do breathing exercises at home as instructed by your doctor. This will help prevent pneumonia.     · Ask your doctor when you can drive again.     · You will probably need to take 4 to 6 weeks off from work. It depends on the type of work you do and how you feel.     · You may be able to take showers (unless you have a drainage tube near your incisions). If you have a drainage tube, follow your doctor's instructions to empty and care for it. Do not take a bath for the first 2 weeks, or until your doctor tells you it is okay.     · Ask your doctor when it is okay for you to have sex. Diet    · You can eat your normal diet. If you were on a special diet for your kidneys before surgery, follow that diet until your doctor tells you to stop.     · If your stomach is upset, try bland, low-fat foods like plain rice, broiled chicken, toast, and yogurt.     · Drink plenty of fluids (unless your doctor tells you not to).     · You may notice that your bowel movements are not regular right after your surgery. This is common. Try to avoid constipation and straining with bowel movements. You may want to take a fiber supplement every day. If you have not had a bowel movement after a couple of days, ask your doctor about taking a mild laxative. Medicines    · Your doctor will tell you if and when you can restart your medicines. He or she will also give you instructions about taking any new medicines.     · If you take aspirin or some other blood thinner, ask your doctor if and when to start taking it again. Make sure that you understand exactly what your doctor wants you to do.     · Take pain medicines exactly as directed. ? If the doctor gave you a prescription medicine for pain, take it as prescribed. ? If you are not taking a prescription pain medicine, take an over-the-counter medicine that your doctor recommends. Read and follow all instructions on the label.   ? Do not take aspirin, ibuprofen (Advil, Motrin), or naproxen (Aleve), or other nonsteroidal anti-inflammatory drugs (NSAIDs) unless your doctor says it is okay.     · If you think your pain medicine is making you sick to your stomach:  ? Take your medicine after meals (unless your doctor has told you not to). ? Ask your doctor for a different pain medicine.     · If your doctor prescribed antibiotics, take them as directed. Do not stop taking them just because you feel better. You need to take the full course of antibiotics. Incision care    · If you have strips of tape on the incisions, leave the tape on for a week or until it falls off.     · Wash the area around the incisions daily with warm, soapy water and pat it dry. Don't use hydrogen peroxide or alcohol, which can slow healing. You may cover the incisions with gauze bandages if they weep or rub against clothing. Change the bandages every day.     · Keep the area around the incisions clean and dry. Follow-up care is a key part of your treatment and safety. Be sure to make and go to all appointments, and call your doctor if you are having problems. It's also a good idea to know your test results and keep a list of the medicines you take. When should you call for help? Call 911 anytime you think you may need emergency care. For example, call if:    · You passed out (lost consciousness).     · You have chest pain, are short of breath, or cough up blood. Call your doctor now or seek immediate medical care if:    · You have pain that does not get better after you take your pain medicine.     · You have symptoms of a urinary tract infection. These may include:  ? Pain or burning when you urinate. ? A frequent need to urinate without being able to pass much urine. ? Pain in the flank, which is just below the rib cage and above the waist on either side of the back. ? Blood in the urine. ? A fever.     · You have signs of infection, such as:  ? Increased pain, swelling, warmth, or redness. ? Red streaks leading from the incisions.   ? Pus draining from the incisions. ? A fever.     · You have loose stitches, or your incisions come open.     · You are bleeding from the incisions.     · You cannot urinate.     · You are sick to your stomach or cannot drink fluids.     · You have signs of a blood clot in your leg (called a deep vein thrombosis), such as:  ? Pain in the calf, back of the knee, thigh, or groin. ? Redness and swelling in your leg. Watch closely for changes in your health, and be sure to contact your doctor if you are having any problems. Where can you learn more? Go to http://www.gray.com/  Enter L270 in the search box to learn more about \"Laparoscopic Nephrectomy: What to Expect at Home. \"  Current as of: December 17, 2020               Content Version: 12.8  © 2006-2021 [employer]. Care instructions adapted under license by [employer] (which disclaims liability or warranty for this information). If you have questions about a medical condition or this instruction, always ask your healthcare professional. [place] [address] any warranty or liability for your use of this information. DISCHARGE SUMMARY from Nurse    PATIENT INSTRUCTIONS:    After general anesthesia or intravenous sedation, for 24 hours or while taking prescription Narcotics:  · Limit your activities  · Do not drive and operate hazardous machinery  · Do not make important personal or business decisions  · Do  not drink alcoholic beverages  · If you have not urinated within 8 hours after discharge, please contact your surgeon on call.     Report the following to your surgeon:  · Excessive pain, swelling, redness or odor of or around the surgical area  · Temperature over 100.5  · Nausea and vomiting lasting longer than 4 hours or if unable to take medications  · Any signs of decreased circulation or nerve impairment to extremity: change in color, persistent  numbness, tingling, coldness or increase pain  · Any questions    What to do at Home:  Recommended activity: No heavy lifting, no driving while taking pain medication. Use Incentive spirometer at home, ambulate often. If you experience any of the following symptoms fever >100.4, nausea and vomiting, pain unrelieved with pain medications, inability to urinate, please follow up with PCP of surgeon. *  Please give a list of your current medications to your Primary Care Provider. *  Please update this list whenever your medications are discontinued, doses are      changed, or new medications (including over-the-counter products) are added. *  Please carry medication information at all times in case of emergency situations. These are general instructions for a healthy lifestyle:    No smoking/ No tobacco products/ Avoid exposure to second hand smoke  Surgeon General's Warning:  Quitting smoking now greatly reduces serious risk to your health. Obesity, smoking, and sedentary lifestyle greatly increases your risk for illness    A healthy diet, regular physical exercise & weight monitoring are important for maintaining a healthy lifestyle    You may be retaining fluid if you have a history of heart failure or if you experience any of the following symptoms:  Weight gain of 3 pounds or more overnight or 5 pounds in a week, increased swelling in our hands or feet or shortness of breath while lying flat in bed. Please call your doctor as soon as you notice any of these symptoms; do not wait until your next office visit. The discharge information has been reviewed with the patient. The patient verbalized understanding. Discharge medications reviewed with the patient and appropriate educational materials and side effects teaching were provided.   ___________________________________________________________________________________________________________________________________

## 2021-05-30 NOTE — PROGRESS NOTES
Urology Progress Note    Admit Date: 5/26/2021    Subjective:     Patient has no new complaints. Passed flatus and 2 BMs yesterday. Afebrile. Tolerated clears. Pain controlled. Ambulating. Voiding spontaneously. Objective:     Patient Vitals for the past 8 hrs:   BP Temp Pulse Resp SpO2   05/30/21 0330 (!) 143/77 99 °F (37.2 °C) 68 12 95 %   05/29/21 2352 (!) 145/79 99.1 °F (37.3 °C) 73 20 95 %     No intake/output data recorded. 05/28 1901 - 05/30 0700  In: -   Out: 5197 [Urine:2575]    Physical Exam:   Visit Vitals  BP (!) 143/77 (BP 1 Location: Left upper arm, BP Patient Position: At rest)   Pulse 68   Temp 99 °F (37.2 °C)   Resp 12   Ht 6' 2\" (1.88 m)   Wt 252 lb 9.6 oz (114.6 kg)   SpO2 95%   BMI 32.43 kg/m²        GENERAL: No acute distress, Awake, Alert, Oriented X   CARDIAC: regular rate and rhythm  CHEST AND LUNG: Easy work of breathing  ABDOMEN: soft, non tender, non-distended, incisions c/d/i with staples          Data Review No results found for this or any previous visit (from the past 24 hour(s)). Assessment:     Active Problems:    Renal mass (10/17/2019)      POD 4 s/p L HALPN with post-op ileus that has now resolved. Plan:     -REgular diet  -SLIV  -PO pain control  -Bowel Regimen  -OOB/Ambulate  -Dispo: Possible D/C home this PM with staple removal in 1 week if tolerates regular diet. Ishan Arrington M.D.     [facility]  [address] [address] [address], [address]  Phone: [phone]  Fax: [phone]

## 2021-06-08 NOTE — DISCHARGE SUMMARY
.  Discharge Summary     Patient: Francisco Salcedo MRN: [id number]  SSN: [id number]    YOB: 1950  Age: 79 y.o. Sex: male      Allergies: [place] medoxomil], Viagra [sildenafil], and Zonegran [zonisamide]    Admit Date: 5/26/2021    Discharge Date: 6/8/2021     * Admission Diagnoses:  Left renal mass [N28.89]  Renal mass [N28.89]     * Discharge Diagnoses:   Hospital Problems as of 5/30/2021 Date Reviewed: 5/21/2021        Codes Class Noted - Resolved POA    Renal mass ICD-10-CM: N28.89  ICD-9-CM: 593.9  10/17/2019 - Present Unknown               * Procedures for this admission:   Procedure(s):  LEFT PARTIAL NEPHRECTOMY LAPAROSCOPIC HAND ASSISTED      * Disposition: Home    * Discharged Condition: improved    * Hospital Course:      Mr. Snider is a 80 yo male with hx of left renal mass and is s/p left hand-assisted laparoscopic partial nephrectomy and left laparoscopic nephropexy on 5/26/21. He ambulated post operatively. Developed ileus. Pt passed flatus and had BMs on POD 3. Diet advanced and he tolerated food. Nolasco removed, pt voided well. He will d/c home and RTO for staple removal and post op visit with Dr. Garry Helm.       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 Max Daily Amount: 6 Tablets., Normal, Disp-20 Tablet, R-0         CONTINUE these medications which have NOT CHANGED    Details   atorvastatin (Lipitor) 80 mg tablet Take 80 mg by mouth nightly., Historical Med      polyethylene glycol (Miralax) 17 gram packet Take 17 g by mouth as needed for Constipation. , Historical Med      phenytoin sodium extended (DILANTIN KAPSEAL PO) Take 300 mg by mouth daily. , Historical Med      tamsulosin (FLOMAX) 0.4 mg capsule Take 1 Cap by mouth nightly for 30 days. , Normal, Disp-30 Cap, R-12      cyanocobalamin 1,000 mcg tablet Take 1,000 mcg by mouth every three (3) days. , Historical Med      allopurinol (ZYLOPRIM) 300 mg tablet TAKE ONE TABLET BY MOUTH ONE TIME DAILY, Normal, Disp-90 Tab, R-1      amLODIPine (NORVASC) 10 mg tablet Take 1 Tab by mouth daily. , Normal, Disp-90 Tab, R-1      atenolol (TENORMIN) 100 mg tablet Take 1 Tab by mouth daily. , Normal, Disp-90 Tab, R-1      potassium chloride (K-DUR, KLOR-CON) 20 mEq tablet Take 1 Tab by mouth daily. , Normal, Disp-90 Tab, R-1      spironolactone (ALDACTONE) 25 mg tablet TAKE ONE TABLET BY MOUTH ONE TIME DAILY, Normal, Disp-90 Tab, R-1      levETIRAcetam (KEPPRA) 750 mg tablet Take 1,500 mg by mouth two (2) times a day., Historical Med      aspirin delayed-release 81 mg tablet Take 243 mg by mouth daily. Pt started Aspirin 81mg 5.19.21 as instructed by Dr Basilia Elaine, Historical Med      DOCUSATE CALCIUM (STOOL SOFTENER PO) Take 2 Tabs by mouth as needed., Historical Med              * Follow-up Care/Patient Instructions:   Activity: no heavy lifting, pushing, pulling, avoid straining  Diet: Regular Diet  Wound Care: None needed    Follow-up Information     Follow up With Specialties Details Why Contact Info    Ava Doshi MD Family Medicine   [address] [place] [address]  JULIO PARMAR [address]  [address] [phone]        Call on 6/1/2021 to make appointment

## 2021-12-07 ENCOUNTER — ANESTHESIA EVENT (OUTPATIENT)
Dept: SURGERY | Age: 71
End: 2021-12-07
Payer: COMMERCIAL

## 2021-12-08 ENCOUNTER — HOSPITAL ENCOUNTER (OUTPATIENT)
Age: 71
Setting detail: OBSERVATION
Discharge: HOME | End: 2021-12-10
Attending: UROLOGY | Admitting: UROLOGY
Payer: COMMERCIAL

## 2021-12-08 ENCOUNTER — ANESTHESIA (OUTPATIENT)
Dept: SURGERY | Age: 71
End: 2021-12-08
Payer: COMMERCIAL

## 2021-12-08 DIAGNOSIS — N40.1 BENIGN PROSTATIC HYPERPLASIA WITH LOWER URINARY TRACT SYMPTOMS, SYMPTOM DETAILS UNSPECIFIED: Primary | ICD-10-CM

## 2021-12-08 DIAGNOSIS — N40.0 BENIGN PROSTATIC HYPERPLASIA, UNSPECIFIED WHETHER LOWER URINARY TRACT SYMPTOMS PRESENT: ICD-10-CM

## 2021-12-08 LAB
ABO + RH BLD: NORMAL
ANION GAP SERPL CALC-SCNC: 3 MMOL/L (ref 7–16)
APPEARANCE UR: CLEAR
BACTERIA URNS QL MICRO: ABNORMAL /HPF
BILIRUB UR QL: NEGATIVE
BLOOD GROUP ANTIBODIES SERPL: NORMAL
BUN SERPL-MCNC: 15 MG/DL (ref 8–23)
CALCIUM SERPL-MCNC: 10.2 MG/DL (ref 8.3–10.4)
CHLORIDE SERPL-SCNC: 107 MMOL/L (ref 98–107)
CO2 SERPL-SCNC: 29 MMOL/L (ref 21–32)
COLOR UR: YELLOW
COVID-19 RAPID TEST, COVR: NOT DETECTED
CREAT SERPL-MCNC: 1.1 MG/DL (ref 0.8–1.5)
ERYTHROCYTE [DISTWIDTH] IN BLOOD BY AUTOMATED COUNT: 13.2 % (ref 11.9–14.6)
GLUCOSE SERPL-MCNC: 75 MG/DL (ref 65–100)
GLUCOSE UR STRIP.AUTO-MCNC: NEGATIVE MG/DL
HCT VFR BLD AUTO: 40.8 % (ref 41.1–50.3)
HGB BLD-MCNC: 12.7 G/DL (ref 13.6–17.2)
HGB UR QL STRIP: ABNORMAL
INR BLD: 1.1 (ref 0.9–1.2)
KETONES UR QL STRIP.AUTO: NEGATIVE MG/DL
LEUKOCYTE ESTERASE UR QL STRIP.AUTO: ABNORMAL
MCH RBC QN AUTO: 27 PG (ref 26.1–32.9)
MCHC RBC AUTO-ENTMCNC: 31.1 G/DL (ref 31.4–35)
MCV RBC AUTO: 86.8 FL (ref 79.6–97.8)
MUCOUS THREADS URNS QL MICRO: ABNORMAL /LPF
NITRITE UR QL STRIP.AUTO: NEGATIVE
NRBC # BLD: 0 K/UL (ref 0–0.2)
OTHER OBSERVATIONS,UCOM: ABNORMAL
PH UR STRIP: 7.5 [PH] (ref 5–9)
PLATELET # BLD AUTO: 202 K/UL (ref 150–450)
PMV BLD AUTO: 9.1 FL (ref 9.4–12.3)
POTASSIUM SERPL-SCNC: 4.1 MMOL/L (ref 3.5–5.1)
PROT UR STRIP-MCNC: NEGATIVE MG/DL
PT BLD: 13.3 SECS (ref 9.6–11.6)
RBC # BLD AUTO: 4.7 M/UL (ref 4.23–5.6)
RBC #/AREA URNS HPF: ABNORMAL /HPF
SODIUM SERPL-SCNC: 139 MMOL/L (ref 138–145)
SOURCE, COVRS: NORMAL
SP GR UR REFRACTOMETRY: 1.02 (ref 1–1.02)
SPECIMEN EXP DATE BLD: NORMAL
UROBILINOGEN UR QL STRIP.AUTO: 0.2 EU/DL (ref 0.2–1)
WBC # BLD AUTO: 5.9 K/UL (ref 4.3–11.1)
WBC URNS QL MICRO: >100 /HPF

## 2021-12-08 PROCEDURE — 2709999900 HC NON-CHARGEABLE SUPPLY: Performed by: UROLOGY

## 2021-12-08 PROCEDURE — 74011250637 HC RX REV CODE- 250/637: Performed by: UROLOGY

## 2021-12-08 PROCEDURE — 81001 URINALYSIS AUTO W/SCOPE: CPT

## 2021-12-08 PROCEDURE — 74011000250 HC RX REV CODE- 250: Performed by: NURSE ANESTHETIST, CERTIFIED REGISTERED

## 2021-12-08 PROCEDURE — G0378 HOSPITAL OBSERVATION PER HR: HCPCS

## 2021-12-08 PROCEDURE — 76210000006 HC OR PH I REC 0.5 TO 1 HR: Performed by: UROLOGY

## 2021-12-08 PROCEDURE — 77030040831 HC BAG URINE DRNG MDII -A: Performed by: UROLOGY

## 2021-12-08 PROCEDURE — 77030003665 HC NDL SPN BBMI -A: Performed by: ANESTHESIOLOGY

## 2021-12-08 PROCEDURE — 85027 COMPLETE CBC AUTOMATED: CPT

## 2021-12-08 PROCEDURE — 87086 URINE CULTURE/COLONY COUNT: CPT

## 2021-12-08 PROCEDURE — 76060000032 HC ANESTHESIA 0.5 TO 1 HR: Performed by: UROLOGY

## 2021-12-08 PROCEDURE — 87088 URINE BACTERIA CULTURE: CPT

## 2021-12-08 PROCEDURE — 76010000138 HC OR TIME 0.5 TO 1 HR: Performed by: UROLOGY

## 2021-12-08 PROCEDURE — 77030019927 HC TBNG IRR CYSTO BAXT -A: Performed by: UROLOGY

## 2021-12-08 PROCEDURE — 77030005546 HC CATH URETH FOL 3W BARD -A: Performed by: UROLOGY

## 2021-12-08 PROCEDURE — 85610 PROTHROMBIN TIME: CPT

## 2021-12-08 PROCEDURE — 80048 BASIC METABOLIC PNL TOTAL CA: CPT

## 2021-12-08 PROCEDURE — 74011250636 HC RX REV CODE- 250/636: Performed by: ANESTHESIOLOGY

## 2021-12-08 PROCEDURE — 86901 BLOOD TYPING SEROLOGIC RH(D): CPT

## 2021-12-08 PROCEDURE — 77030040832 HC IRR TRAY MDII -A

## 2021-12-08 PROCEDURE — 87635 SARS-COV-2 COVID-19 AMP PRB: CPT

## 2021-12-08 PROCEDURE — 77030007880 HC KT SPN EPDRL BBMI -B: Performed by: ANESTHESIOLOGY

## 2021-12-08 PROCEDURE — 87186 SC STD MICRODIL/AGAR DIL: CPT

## 2021-12-08 PROCEDURE — 74011250636 HC RX REV CODE- 250/636: Performed by: UROLOGY

## 2021-12-08 PROCEDURE — 52601 PROSTATECTOMY (TURP): CPT | Performed by: UROLOGY

## 2021-12-08 PROCEDURE — 74011250636 HC RX REV CODE- 250/636: Performed by: NURSE ANESTHETIST, CERTIFIED REGISTERED

## 2021-12-08 PROCEDURE — 88305 TISSUE EXAM BY PATHOLOGIST: CPT

## 2021-12-08 RX ORDER — OXYBUTYNIN CHLORIDE 5 MG/1
5 TABLET ORAL
Status: DISCONTINUED | OUTPATIENT
Start: 2021-12-08 | End: 2021-12-10 | Stop reason: HOSPADM

## 2021-12-08 RX ORDER — CIPROFLOXACIN 250 MG/1
250 TABLET, FILM COATED ORAL EVERY 12 HOURS
Status: DISCONTINUED | OUTPATIENT
Start: 2021-12-08 | End: 2021-12-10 | Stop reason: HOSPADM

## 2021-12-08 RX ORDER — OXYCODONE HYDROCHLORIDE 5 MG/1
10 TABLET ORAL
Status: DISCONTINUED | OUTPATIENT
Start: 2021-12-08 | End: 2021-12-08

## 2021-12-08 RX ORDER — NALOXONE HYDROCHLORIDE 0.4 MG/ML
0.1 INJECTION, SOLUTION INTRAMUSCULAR; INTRAVENOUS; SUBCUTANEOUS AS NEEDED
Status: DISCONTINUED | OUTPATIENT
Start: 2021-12-08 | End: 2021-12-08

## 2021-12-08 RX ORDER — ALBUTEROL SULFATE 0.83 MG/ML
2.5 SOLUTION RESPIRATORY (INHALATION) AS NEEDED
Status: DISCONTINUED | OUTPATIENT
Start: 2021-12-08 | End: 2021-12-08

## 2021-12-08 RX ORDER — POLYETHYLENE GLYCOL 3350 17 G/17G
17 POWDER, FOR SOLUTION ORAL AS NEEDED
Status: DISCONTINUED | OUTPATIENT
Start: 2021-12-08 | End: 2021-12-10 | Stop reason: HOSPADM

## 2021-12-08 RX ORDER — PROPOFOL 10 MG/ML
INJECTION, EMULSION INTRAVENOUS
Status: DISCONTINUED | OUTPATIENT
Start: 2021-12-08 | End: 2021-12-08 | Stop reason: HOSPADM

## 2021-12-08 RX ORDER — ALLOPURINOL 300 MG/1
300 TABLET ORAL DAILY
Status: DISCONTINUED | OUTPATIENT
Start: 2021-12-09 | End: 2021-12-10 | Stop reason: HOSPADM

## 2021-12-08 RX ORDER — ATORVASTATIN CALCIUM 40 MG/1
80 TABLET, FILM COATED ORAL
Status: DISCONTINUED | OUTPATIENT
Start: 2021-12-09 | End: 2021-12-10 | Stop reason: HOSPADM

## 2021-12-08 RX ORDER — HYDROCODONE BITARTRATE AND ACETAMINOPHEN 5; 325 MG/1; MG/1
1 TABLET ORAL
Status: DISCONTINUED | OUTPATIENT
Start: 2021-12-08 | End: 2021-12-10 | Stop reason: HOSPADM

## 2021-12-08 RX ORDER — BUPIVACAINE HYDROCHLORIDE 7.5 MG/ML
INJECTION, SOLUTION INTRASPINAL AS NEEDED
Status: DISCONTINUED | OUTPATIENT
Start: 2021-12-08 | End: 2021-12-08 | Stop reason: HOSPADM

## 2021-12-08 RX ORDER — SODIUM CHLORIDE, SODIUM LACTATE, POTASSIUM CHLORIDE, CALCIUM CHLORIDE 600; 310; 30; 20 MG/100ML; MG/100ML; MG/100ML; MG/100ML
100 INJECTION, SOLUTION INTRAVENOUS CONTINUOUS
Status: DISCONTINUED | OUTPATIENT
Start: 2021-12-08 | End: 2021-12-08 | Stop reason: HOSPADM

## 2021-12-08 RX ORDER — FENTANYL CITRATE 50 UG/ML
100 INJECTION, SOLUTION INTRAMUSCULAR; INTRAVENOUS ONCE
Status: DISCONTINUED | OUTPATIENT
Start: 2021-12-08 | End: 2021-12-08 | Stop reason: HOSPADM

## 2021-12-08 RX ORDER — AMLODIPINE BESYLATE 5 MG/1
10 TABLET ORAL DAILY
Status: DISCONTINUED | OUTPATIENT
Start: 2021-12-09 | End: 2021-12-10 | Stop reason: HOSPADM

## 2021-12-08 RX ORDER — OXYCODONE HYDROCHLORIDE 5 MG/1
5 TABLET ORAL
Status: DISCONTINUED | OUTPATIENT
Start: 2021-12-08 | End: 2021-12-08

## 2021-12-08 RX ORDER — ONDANSETRON 2 MG/ML
4 INJECTION INTRAMUSCULAR; INTRAVENOUS ONCE
Status: DISCONTINUED | OUTPATIENT
Start: 2021-12-08 | End: 2021-12-08

## 2021-12-08 RX ORDER — LEVETIRACETAM 500 MG/1
1500 TABLET ORAL 2 TIMES DAILY
Status: DISCONTINUED | OUTPATIENT
Start: 2021-12-08 | End: 2021-12-10 | Stop reason: HOSPADM

## 2021-12-08 RX ORDER — MIDAZOLAM HYDROCHLORIDE 1 MG/ML
2 INJECTION, SOLUTION INTRAMUSCULAR; INTRAVENOUS
Status: DISCONTINUED | OUTPATIENT
Start: 2021-12-08 | End: 2021-12-08 | Stop reason: HOSPADM

## 2021-12-08 RX ORDER — SODIUM CHLORIDE, SODIUM LACTATE, POTASSIUM CHLORIDE, CALCIUM CHLORIDE 600; 310; 30; 20 MG/100ML; MG/100ML; MG/100ML; MG/100ML
100 INJECTION, SOLUTION INTRAVENOUS CONTINUOUS
Status: DISCONTINUED | OUTPATIENT
Start: 2021-12-08 | End: 2021-12-08

## 2021-12-08 RX ORDER — ACETAMINOPHEN 325 MG/1
650 TABLET ORAL
Status: DISCONTINUED | OUTPATIENT
Start: 2021-12-08 | End: 2021-12-10 | Stop reason: HOSPADM

## 2021-12-08 RX ORDER — SODIUM CHLORIDE 9 MG/ML
75 INJECTION, SOLUTION INTRAVENOUS CONTINUOUS
Status: DISCONTINUED | OUTPATIENT
Start: 2021-12-08 | End: 2021-12-10 | Stop reason: HOSPADM

## 2021-12-08 RX ORDER — ONDANSETRON 2 MG/ML
4 INJECTION INTRAMUSCULAR; INTRAVENOUS
Status: DISCONTINUED | OUTPATIENT
Start: 2021-12-08 | End: 2021-12-10 | Stop reason: HOSPADM

## 2021-12-08 RX ORDER — HYDROMORPHONE HYDROCHLORIDE 2 MG/ML
0.5 INJECTION, SOLUTION INTRAMUSCULAR; INTRAVENOUS; SUBCUTANEOUS
Status: DISCONTINUED | OUTPATIENT
Start: 2021-12-08 | End: 2021-12-08

## 2021-12-08 RX ORDER — POTASSIUM CHLORIDE 20 MEQ/1
20 TABLET, EXTENDED RELEASE ORAL DAILY
Status: DISCONTINUED | OUTPATIENT
Start: 2021-12-09 | End: 2021-12-10 | Stop reason: HOSPADM

## 2021-12-08 RX ORDER — MIDAZOLAM HYDROCHLORIDE 1 MG/ML
2 INJECTION, SOLUTION INTRAMUSCULAR; INTRAVENOUS ONCE
Status: DISCONTINUED | OUTPATIENT
Start: 2021-12-08 | End: 2021-12-08 | Stop reason: HOSPADM

## 2021-12-08 RX ORDER — ATENOLOL 50 MG/1
100 TABLET ORAL DAILY
Status: DISCONTINUED | OUTPATIENT
Start: 2021-12-09 | End: 2021-12-10 | Stop reason: HOSPADM

## 2021-12-08 RX ORDER — LEVOFLOXACIN 5 MG/ML
500 INJECTION, SOLUTION INTRAVENOUS ONCE
Status: COMPLETED | OUTPATIENT
Start: 2021-12-08 | End: 2021-12-08

## 2021-12-08 RX ORDER — DOCUSATE SODIUM 100 MG/1
100 CAPSULE, LIQUID FILLED ORAL 2 TIMES DAILY
Status: DISCONTINUED | OUTPATIENT
Start: 2021-12-08 | End: 2021-12-10 | Stop reason: HOSPADM

## 2021-12-08 RX ORDER — LIDOCAINE HYDROCHLORIDE 10 MG/ML
0.1 INJECTION INFILTRATION; PERINEURAL AS NEEDED
Status: DISCONTINUED | OUTPATIENT
Start: 2021-12-08 | End: 2021-12-08 | Stop reason: HOSPADM

## 2021-12-08 RX ORDER — MORPHINE SULFATE 2 MG/ML
2 INJECTION, SOLUTION INTRAMUSCULAR; INTRAVENOUS
Status: DISCONTINUED | OUTPATIENT
Start: 2021-12-08 | End: 2021-12-10 | Stop reason: HOSPADM

## 2021-12-08 RX ORDER — DIPHENHYDRAMINE HYDROCHLORIDE 50 MG/ML
12.5 INJECTION, SOLUTION INTRAMUSCULAR; INTRAVENOUS
Status: DISCONTINUED | OUTPATIENT
Start: 2021-12-08 | End: 2021-12-08

## 2021-12-08 RX ORDER — ASPIRIN 81 MG/1
243 TABLET ORAL DAILY
Status: DISCONTINUED | OUTPATIENT
Start: 2021-12-09 | End: 2021-12-10 | Stop reason: HOSPADM

## 2021-12-08 RX ORDER — ATROPA BELLADONNA AND OPIUM 16.2; 3 MG/1; MG/1
1 SUPPOSITORY RECTAL
Status: DISCONTINUED | OUTPATIENT
Start: 2021-12-08 | End: 2021-12-10 | Stop reason: HOSPADM

## 2021-12-08 RX ORDER — SPIRONOLACTONE 25 MG/1
25 TABLET ORAL DAILY
Status: DISCONTINUED | OUTPATIENT
Start: 2021-12-09 | End: 2021-12-10 | Stop reason: HOSPADM

## 2021-12-08 RX ORDER — BUPIVACAINE HYDROCHLORIDE 7.5 MG/ML
INJECTION, SOLUTION INTRASPINAL
Status: COMPLETED | OUTPATIENT
Start: 2021-12-08 | End: 2021-12-08

## 2021-12-08 RX ADMIN — SODIUM CHLORIDE 75 ML/HR: 900 INJECTION, SOLUTION INTRAVENOUS at 19:50

## 2021-12-08 RX ADMIN — DOCUSATE SODIUM 100 MG: 100 CAPSULE, LIQUID FILLED ORAL at 19:49

## 2021-12-08 RX ADMIN — LEVOFLOXACIN 500 MG: 5 INJECTION, SOLUTION INTRAVENOUS at 16:25

## 2021-12-08 RX ADMIN — BUPIVACAINE HYDROCHLORIDE 10 MG: 7.5 INJECTION, SOLUTION INTRASPINAL at 16:23

## 2021-12-08 RX ADMIN — BUPIVACAINE HYDROCHLORIDE IN DEXTROSE 2 ML: 7.5 INJECTION, SOLUTION SUBARACHNOID at 16:23

## 2021-12-08 RX ADMIN — PROPOFOL 140 MCG/KG/MIN: 10 INJECTION, EMULSION INTRAVENOUS at 16:23

## 2021-12-08 RX ADMIN — CIPROFLOXACIN 250 MG: 250 TABLET, FILM COATED ORAL at 21:09

## 2021-12-08 RX ADMIN — SODIUM CHLORIDE, SODIUM LACTATE, POTASSIUM CHLORIDE, AND CALCIUM CHLORIDE 100 ML/HR: 600; 310; 30; 20 INJECTION, SOLUTION INTRAVENOUS at 15:38

## 2021-12-08 RX ADMIN — ATROPA BELLADONNA AND OPIUM 1 SUPPOSITORY: 16.2; 3 SUPPOSITORY RECTAL at 23:16

## 2021-12-08 RX ADMIN — LEVETIRACETAM 1500 MG: 500 TABLET, FILM COATED ORAL at 19:49

## 2021-12-08 NOTE — OP NOTES
[facility]  OPERATIVE REPORT    Name:  Catalina Alegre  MR#:  [id number]  :  1950  ACCOUNT #:  [de-identified]  DATE OF SERVICE:  2021    PREOPERATIVE DIAGNOSES:  Benign prostatic hyperplasia and recurrent urinary tract infections. POSTOPERATIVE DIAGNOSES:  Benign prostatic hyperplasia and recurrent urinary tract infections. PROCEDURE PERFORMED:  Transurethral section of the prostate. SURGEON:  Ramya Hawk DO    ASSISTANT:  None. ANESTHESIA:  Spinal.    COMPLICATIONS:  None immediate. SPECIMENS REMOVED:  Prostate chips. IMPLANTS:  None. ESTIMATED BLOOD LOSS:  50 mL. CLINICAL HISTORY:  This is a 68-year-old gentleman who I have followed for a history of renal cell cancer as well as BPH and recent recurrent urinary tract infections. Recent cystoscopy revealed bilobar hypertrophy of the prostate. His last PSA was 2.6 on 2021. All risks, benefits and alternatives to the above-mentioned procedure have been reviewed and he is willing to proceed at this time. PROCEDURE:  Patient consent was obtained. The patient was brought back to the operating room at which time he was placed in the sitting position. After the uneventful placement of a spinal anesthetic, he was then placed in a dorsal lithotomy position. His genital area was prepped and draped and a sterile field applied. A 28-Greenlandic resectoscope was inserted into the urethra and advanced into the bladder using obturator guidance. The resectoscope was assembled. Mild bladder trabeculations were noted. Single bilateral ureteral orifices were seen in their normal orthotopic position. Bilobar hypertrophy of the prostate was noted with a high-riding bladder neck. I first began by resecting the posterior component of the prostate down to the prostatic capsule. Care was taken to avoid injury to the ureteral orifices bilaterally.   The right and left lateral lobes were then resected down to the capsule respectively. A small amount of tissue was also resected at the 12 o'clock position. Following creation of an adequate prostate channel, all prostate chips were evacuated using the resectoscope. The distal margin of resection was the verumontanum. Following evacuation of all prostate chips, hemostasis was obtained using spot electrocautery. Following complete hemostasis, the scope was removed and a 24-English three-way catheter was placed to continuous bladder irrigation. The patient tolerated the procedure well. Estimated blood loss was 150 mL.       DO DIANNA [id number]  D:  12/08/2021 17:00  T:  12/08/2021 17:17  JOB #:  [id number]

## 2021-12-08 NOTE — BRIEF OP NOTE
Brief Postoperative Note    Patient: Sina Bosch  YOB: 1950  MRN: [id number]    Date of Procedure: 12/8/2021     Pre-Op Diagnosis: Benign localized prostatic hyperplasia with lower urinary tract symptoms (LUTS) [N40.1]    Post-Op Diagnosis: Same    Procedure(s):  CYSTOSCOPY TRANSURETHRAL RESECTION OF PROSTATE    Surgeon(s):  Joanie Hawk DO    Surgical Assistant: None    Anesthesia: General     Estimated Blood Loss (mL): 99NC    Complications: none immediate    Specimens:   ID Type Source Tests Collected by Time Destination   1 : Prostate Chips Preservative Prostate  Donny Berg DO 12/8/2021 1644 Pathology        Implants: * No implants in log *    Drains: * No LDAs found *    Findings: see op note    Electronically Signed by Keila Tabor DO on 12/8/2021 at 4:51 PM

## 2021-12-08 NOTE — PERIOP NOTES
TRANSFER - OUT REPORT:    Verbal report given to Laura Kay RN on Ángela Hatfield  being transferred to [phone] for routine post - op       Report consisted of patients Situation, Background, Assessment and   Recommendations(SBAR). Information from the following report(s) OR Summary, Procedure Summary, Intake/Output and MAR was reviewed with the receiving nurse. Lines:   Peripheral IV 12/08/21 Left; Posterior Hand (Active)   Site Assessment Clean, dry, & intact 12/08/21 1748   Phlebitis Assessment 0 12/08/21 1748   Infiltration Assessment 0 12/08/21 1748   Dressing Status Clean, dry, & intact 12/08/21 1748   Dressing Type Tape; Transparent 12/08/21 1748   Hub Color/Line Status Patent 12/08/21 1748        Opportunity for questions and clarification was provided. Patient transported with:   O2 @ 2 liters    VTE prophylaxis orders have been written for Ángela Hatfield. Patient and family given floor number and nurses name.

## 2021-12-08 NOTE — ANESTHESIA PREPROCEDURE EVALUATION
Relevant Problems   NEUROLOGY   (+) Partial epilepsy with impairment of consciousness (HCC)   (+) Seizure (HCC)      CARDIOVASCULAR   (+) Essential hypertension      GASTROINTESTINAL   (+) GERD (gastroesophageal reflux disease)   (+) PUD (peptic ulcer disease)       Anesthetic History   No history of anesthetic complications            Review of Systems / Medical History  Patient summary reviewed and pertinent labs reviewed    Pulmonary        Sleep apnea  Undiagnosed apnea         Neuro/Psych     seizures (Keppra and Dilantin ): well controlled  CVA: no residual symptoms      Comments: H/O cervical stenosis    Seizures after CVA in 2015 Cardiovascular    Hypertension: well controlled          Hyperlipidemia    Exercise tolerance: >4 METS  Comments: Denies CP, SOB or changes in functional status   GI/Hepatic/Renal     GERD: well controlled    Renal disease: CRI  PUD    Comments: Right renal mass Endo/Other        Obesity and arthritis     Other Findings   Comments: Gouty arthritis           Physical Exam    Airway  Mallampati: II  TM Distance: > 6 cm  Neck ROM: normal range of motion   Mouth opening: Normal     Cardiovascular  Regular rate and rhythm,  S1 and S2 normal,  no murmur, click, rub, or gallop  Rhythm: regular  Rate: normal         Dental    Dentition: Full upper dentures     Pulmonary  Breath sounds clear to auscultation               Abdominal  GI exam deferred       Other Findings            Anesthetic Plan    ASA: 3  Anesthesia type: spinal            Anesthetic plan and risks discussed with: Patient

## 2021-12-08 NOTE — ANESTHESIA PROCEDURE NOTES
Spinal Block    Start time: 12/8/2021 4:16 PM  End time: 12/8/2021 4:23 PM  Performed by: Ashley Faustin MD  Authorized by: Ashley Faustin MD     Pre-procedure:   Indications: primary anesthetic  Preanesthetic Checklist: patient identified, risks and benefits discussed, anesthesia consent, patient being monitored and timeout performed    Timeout Time: 16:16 EST          Spinal Block:   Patient Position:  Seated  Prep Region:  Lumbar  Prep: chlorhexidine      Location:  L3-4  Technique:  Single shot    Local Dose (mL):  3    Needle:   Needle Type:  Quincke  Needle Gauge:  22 G  Attempts:  2      Events: CSF confirmed, no blood with aspiration and no paresthesia        Assessment:  Insertion:  Uncomplicated  Patient tolerance:  Patient tolerated the procedure well with no immediate complications  All needles out intact, procedure tolerated well without problems

## 2021-12-08 NOTE — H&P
[facility]  [place], [address]  [phone]    Lizzie Vo  : 1950     HPI   70 y.o., male returns in follow up for BPH and renal cancer. S/P L HALPN on .  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 Steven Garcia has done well post op.  S/P R HALPN on  for a [id number] RCC with neg margins.  History of recurrent Klebsiella UTI's and continues to report intermittent dysuria with nocturia times one on Flomax.  PVR at last visit was ultrasound.  PSA was 2.6 on 21. Cysto on 10/8/21 showed bilobar hypertrophy. 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    Cervical stenosis of spinal canal 2009    Chronic cystitis 2019    Colloid cyst of brain (HCC)     No significant change in the appearance of 5 mm colloid cyst in the superior third ventricle- followed by Dr Dianelys Barnhart Constipation     occassionally- managed with PRN meds    Diverticulosis of colon     Dysarthria     hx of CVA- no current deficits    Genital herpes     GERD (gastroesophageal reflux disease)     well controlled with medication    Gout, unspecified     Headache     History of CVA (cerebrovascular accident)     no deficits    History of gastric ulcer     Hypercholesteremia     Hypertension     managed with medications    Impotence     Left renal mass     Nevus, non-neoplastic     Observed sleep apnea     pt sleeps with several pillows for elevation.  pt in the process of getting a sleep study- no cpap    Other specified disorder of male genital organs(608.89)     PUD (peptic ulcer disease)     gastric ulcer bleeding     Seizures ([facility] [place] [address]     seizures started after stroke in - pt denies any recent seizures    Testicular mass      Past Surgical History:   Procedure Laterality Date    HX CERVICAL FUSION  2009    HX COLONOSCOPY      HX UROLOGICAL  1985    benign tumor on Left testicle      Current Facility-Administered Medications   Medication Dose Route Frequency Provider Last Rate Last Admin    lidocaine (XYLOCAINE) 10 mg/mL (1 %) injection 0.1 mL  0.1 mL SubCUTAneous PRN Ivan Salcedo MD        lactated Ringers infusion  100 mL/hr IntraVENous CONTINUOUS Ivan Salcedo MD        fentaNYL citrate (PF) injection 100 mcg  100 mcg IntraVENous ONCE Ivan Nicole MD        midazolam (VERSED) injection 2 mg  2 mg IntraVENous ONCE PRN Santos Barba MD        midazolam (VERSED) injection 2 mg  2 mg IntraVENous ONCE Ivan Nicole MD        levoFLOXacin (LEVAQUIN) 500 mg in D5W IVPB  500 mg IntraVENous ONCE Hubert Hawk DO         Allergies   Allergen Reactions    Bactrim [Sulfamethoxazole-Trimethoprim] Itching    Marcelyn Cea Hindman Prom Medoxomil] Other (comments)     Dizziness, loss of appetite.  Viagra [Sildenafil] Unknown (comments)    Zonegran [Zonisamide] Other (comments)     confusion     Social History     Socioeconomic History    Marital status:      Spouse name: Not on file    Number of children: Not on file    Years of education: Not on file    Highest education level: Not on file   Occupational History    Not on file   Tobacco Use    Smoking status: Never Smoker    Smokeless tobacco: Never Used   Vaping Use    Vaping Use: Never used   Substance and Sexual Activity    Alcohol use: No    Drug use: No    Sexual activity: Never   Other Topics Concern    Not on file   Social History Narrative    Not on file     Social Determinants of Health     Financial Resource Strain:     Difficulty of Paying Living Expenses: Not on file   Food Insecurity:     Worried About Running Out of Food in the Last Year: Not on file    [place] of Food in the Last Year: Not on file   Transportation Needs:     Lack of Transportation (Medical): Not on file    Lack of Transportation (Non-Medical):  Not on file Physical Activity:     Days of Exercise per Week: Not on file    Minutes of Exercise per Session: Not on file   Stress:     Feeling of Stress : Not on file   Social Connections:     Frequency of Communication with Friends and Family: Not on file    Frequency of Social Gatherings with Friends and Family: Not on file    Attends Orthodoxy Services: Not on file    Active Member of [address] [place] [address] [employer] or Organizations: Not on file    Attends Club or Organization Meetings: Not on file    Marital Status: Not on file   Intimate Partner Violence:     Fear of Current or Ex-Partner: Not on file    Emotionally Abused: Not on file    Physically Abused: Not on file    Sexually Abused: Not on file   Housing Stability:     Unable to Pay for Housing in the Last Year: Not on file    Number of [place] in the Last Year: Not on file    Unstable Housing in the Last Year: Not on file     Family History   Problem Relation Age of Onset    Hypertension Mother     Stroke Mother     Heart Disease Mother     No Known Problems Father     Hypertension Sister     Hypertension Brother     Diabetes Brother     Other Brother         Kidney Transplant    Hypertension Sister     Hypertension Other         gen fam hx       Review of Systems  All systems reviewed and are negative at this time.     Physical Exam  Visit Vitals  BP (!) 186/89 (BP 1 Location: Right upper arm, BP Patient Position: At rest)   Pulse (!) 54   Temp 98.1 °F (36.7 °C)   Resp 16   Ht 6' 2\" (1.88 m)   Wt 251 lb (113.9 kg)   SpO2 97%   BMI 32.23 kg/m²     General appearance - alert, well appearing, and in no distress  Mental status - alert and oriented  Eyes - extraocular eye movements intact, sclera anicteric  Nose - normal and patent, no erythema or discharge  Mouth - mucous membranes moist  Chest - clear to auscultation bilaterally  Heart - normal rate, regular rhythm  Abdomen - soft, nontender, nondistended, no masses or organomegaly  Neurological - normal speech, no focal findings or movement disorder noted  Skin - normal coloration and turgor        Assessment/Plan  BPH/recurrent UTI's. He has elected to proceed with a TURP. All risks, benefits and alternatives to the above mentioned procedure were discussed and the patient is willing to proceed at this time.     Marcella Hawk DO

## 2021-12-08 NOTE — ANESTHESIA POSTPROCEDURE EVALUATION
Procedure(s):  CYSTOSCOPY TRANSURETHRAL RESECTION OF PROSTATE.    spinal    Anesthesia Post Evaluation      Multimodal analgesia: multimodal analgesia used between 6 hours prior to anesthesia start to PACU discharge  Patient location during evaluation: bedside  Patient participation: complete - patient participated  Level of consciousness: awake and alert  Pain management: adequate  Airway patency: patent  Anesthetic complications: no  Cardiovascular status: hemodynamically stable  Respiratory status: spontaneous ventilation  Hydration status: euvolemic  Comments: Patient stable and may discharge at this time. Post anesthesia nausea and vomiting:  none  Final Post Anesthesia Temperature Assessment:  Normothermia (36.0-37.5 degrees C)  Good result with spinal anesthesia, resolving normally. INITIAL Post-op Vital signs:   Vitals Value Taken Time   /112 12/08/21 1733   Temp 36.7 °C (98.1 °F) 12/08/21 1658   Pulse 50 12/08/21 1734   Resp 16 12/08/21 1717   SpO2 97 % 12/08/21 1734   Vitals shown include unvalidated device data.

## 2021-12-09 LAB
ANION GAP SERPL CALC-SCNC: 7 MMOL/L (ref 7–16)
ANION GAP SERPL CALC-SCNC: 7 MMOL/L (ref 7–16)
BUN SERPL-MCNC: 14 MG/DL (ref 8–23)
BUN SERPL-MCNC: 14 MG/DL (ref 8–23)
CALCIUM SERPL-MCNC: 9.6 MG/DL (ref 8.3–10.4)
CALCIUM SERPL-MCNC: 9.8 MG/DL (ref 8.3–10.4)
CHLORIDE SERPL-SCNC: 103 MMOL/L (ref 98–107)
CHLORIDE SERPL-SCNC: 106 MMOL/L (ref 98–107)
CO2 SERPL-SCNC: 29 MMOL/L (ref 21–32)
CO2 SERPL-SCNC: 30 MMOL/L (ref 21–32)
CREAT SERPL-MCNC: 0.96 MG/DL (ref 0.8–1.5)
CREAT SERPL-MCNC: 0.98 MG/DL (ref 0.8–1.5)
GLUCOSE SERPL-MCNC: 72 MG/DL (ref 65–100)
GLUCOSE SERPL-MCNC: 73 MG/DL (ref 65–100)
HCT VFR BLD AUTO: 36.6 % (ref 41.1–50.3)
HCT VFR BLD AUTO: 37.3 % (ref 41.1–50.3)
HGB BLD-MCNC: 11.6 G/DL (ref 13.6–17.2)
HGB BLD-MCNC: 11.8 G/DL (ref 13.6–17.2)
POTASSIUM SERPL-SCNC: 3.4 MMOL/L (ref 3.5–5.1)
POTASSIUM SERPL-SCNC: 3.5 MMOL/L (ref 3.5–5.1)
SODIUM SERPL-SCNC: 139 MMOL/L (ref 138–145)
SODIUM SERPL-SCNC: 143 MMOL/L (ref 136–145)

## 2021-12-09 PROCEDURE — 36415 COLL VENOUS BLD VENIPUNCTURE: CPT

## 2021-12-09 PROCEDURE — 74011250637 HC RX REV CODE- 250/637: Performed by: UROLOGY

## 2021-12-09 PROCEDURE — G0378 HOSPITAL OBSERVATION PER HR: HCPCS

## 2021-12-09 PROCEDURE — 2709999900 HC NON-CHARGEABLE SUPPLY

## 2021-12-09 PROCEDURE — 80048 BASIC METABOLIC PNL TOTAL CA: CPT

## 2021-12-09 PROCEDURE — 85014 HEMATOCRIT: CPT

## 2021-12-09 RX ORDER — CHLORTHALIDONE 25 MG/1
25 TABLET ORAL DAILY
COMMUNITY

## 2021-12-09 RX ADMIN — CIPROFLOXACIN 250 MG: 250 TABLET, FILM COATED ORAL at 21:14

## 2021-12-09 RX ADMIN — ASPIRIN 243 MG: 81 TABLET ORAL at 08:56

## 2021-12-09 RX ADMIN — DOCUSATE SODIUM 100 MG: 100 CAPSULE, LIQUID FILLED ORAL at 08:56

## 2021-12-09 RX ADMIN — SPIRONOLACTONE 25 MG: 25 TABLET ORAL at 08:56

## 2021-12-09 RX ADMIN — ALLOPURINOL 300 MG: 300 TABLET ORAL at 08:55

## 2021-12-09 RX ADMIN — CIPROFLOXACIN 250 MG: 250 TABLET, FILM COATED ORAL at 08:56

## 2021-12-09 RX ADMIN — LEVETIRACETAM 1500 MG: 500 TABLET, FILM COATED ORAL at 18:36

## 2021-12-09 RX ADMIN — OXYBUTYNIN CHLORIDE 5 MG: 5 TABLET ORAL at 18:42

## 2021-12-09 RX ADMIN — POTASSIUM CHLORIDE 20 MEQ: 20 TABLET, EXTENDED RELEASE ORAL at 08:56

## 2021-12-09 RX ADMIN — ATORVASTATIN CALCIUM 80 MG: 40 TABLET, FILM COATED ORAL at 21:14

## 2021-12-09 RX ADMIN — AMLODIPINE BESYLATE 10 MG: 5 TABLET ORAL at 08:56

## 2021-12-09 RX ADMIN — LEVETIRACETAM 1500 MG: 500 TABLET, FILM COATED ORAL at 08:56

## 2021-12-09 RX ADMIN — DOCUSATE SODIUM 100 MG: 100 CAPSULE, LIQUID FILLED ORAL at 18:36

## 2021-12-09 RX ADMIN — HYDROCODONE BITARTRATE AND ACETAMINOPHEN 1 TABLET: 5; 325 TABLET ORAL at 15:59

## 2021-12-09 RX ADMIN — ATENOLOL 100 MG: 50 TABLET ORAL at 08:55

## 2021-12-09 NOTE — PROGRESS NOTES
Pt has lost IV access and RNs have tried with unsuccessful attempts. Guy nurse called to attempt. Stated will come when able. Lab techs have also tried to draw lab work since 1900 with failed attempt.  #2 attempted with failed attempt and pt upset and refusing lab draws at this time. Pt stated he will allow AM labs to be drawn.

## 2021-12-09 NOTE — PROGRESS NOTES
Hourly rounds completed this shift. All needs met at this time. Bed low/locked. Call light within reach. Will continue to monitor and give bedside report to oncoming nurse. CBI infusing at moderate gtt. Urine pink/red flecks.

## 2021-12-09 NOTE — PROGRESS NOTES
Problem: Falls - Risk of  Goal: *Absence of Falls  Description: Document Kay Pete Fall Risk and appropriate interventions in the flowsheet.   Outcome: Progressing Towards Goal  Note: Fall Risk Interventions:            Medication Interventions: Evaluate medications/consider consulting pharmacy, Patient to call before getting OOB, Teach patient to arise slowly                   Problem: Patient Education: Go to Patient Education Activity  Goal: Patient/Family Education  Outcome: Progressing Towards Goal     Problem: Infection - Risk of, Urinary Catheter-Associated Urinary Tract Infection  Goal: *Absence of infection signs and symptoms  Outcome: Progressing Towards Goal     Problem: Patient Education: Go to Patient Education Activity  Goal: Patient/Family Education  Outcome: Progressing Towards Goal

## 2021-12-09 NOTE — PROGRESS NOTES
Admit Date: 12/8/2021    Subjective:     Pako Philip is POD 1 TURP. Urine clearing with CBI.  VSS. Objective:     Patient Vitals for the past 8 hrs:   BP Temp Pulse Resp SpO2   12/09/21 0752 131/76 99.3 °F (37.4 °C) 65 18 97 %   12/09/21 0419 (!) 142/72 99.4 °F (37.4 °C) 69 20 98 %     12/09 0701 - 12/09 1900  In: 3000   Out: 5000 [Urine:5000]  12/07 1901 - 12/09 0700  In: 83732 [I.V.:500]  Out: 40064 [Urine:63297]    Physical Exam:  GENERAL: alert, cooperative, no distress  LUNG: clear to auscultation bilaterally  HEART: regular rate and rhythm, S1, S2   ABDOMEN: soft, non-tender  NEUROLOGIC: AOx3  Data Review   Recent Results (from the past 24 hour(s))   CBC W/O DIFF    Collection Time: 12/08/21  2:57 PM   Result Value Ref Range    WBC 5.9 4.3 - 11.1 K/uL    RBC 4.70 4.23 - 5.6 M/uL    HGB 12.7 (L) 13.6 - 17.2 g/dL    HCT 40.8 (L) 41.1 - 50.3 %    MCV 86.8 79.6 - 97.8 FL    MCH 27.0 26.1 - 32.9 PG    MCHC 31.1 (L) 31.4 - 35.0 g/dL    RDW 13.2 11.9 - 14.6 %    PLATELET 503 776 - 553 K/uL    MPV 9.1 (L) 9.4 - 12.3 FL    ABSOLUTE NRBC 0.00 0.0 - 0.2 K/uL   CULTURE, URINE    Collection Time: 12/08/21  2:57 PM    Specimen: Clean catch; Urine   Result Value Ref Range    Special Requests: NO SPECIAL REQUESTS      Culture result:        NO GROWTH AFTER SHORT PERIOD OF INCUBATION. FURTHER RESULTS TO FOLLOW AFTER OVERNIGHT INCUBATION.    METABOLIC PANEL, BASIC    Collection Time: 12/08/21  2:57 PM   Result Value Ref Range    Sodium 139 138 - 145 mmol/L    Potassium 4.1 3.5 - 5.1 mmol/L    Chloride 107 98 - 107 mmol/L    CO2 29 21 - 32 mmol/L    Anion gap 3 (L) 7 - 16 mmol/L    Glucose 75 65 - 100 mg/dL    BUN 15 8 - 23 MG/DL    Creatinine 1.10 0.8 - 1.5 MG/DL    GFR est AA >60 >60 ml/min/1.73m2    GFR est non-AA >60 >60 ml/min/1.73m2    Calcium 10.2 8.3 - 10.4 MG/DL   URINALYSIS W/ RFLX MICROSCOPIC    Collection Time: 12/08/21  2:57 PM   Result Value Ref Range    Color YELLOW      Appearance CLEAR      Specific gravity 1.021 1.001 - 1.023      pH (UA) 7.5 5.0 - 9.0      Protein Negative NEG mg/dL    Glucose Negative mg/dL    Ketone Negative NEG mg/dL    Bilirubin Negative NEG      Blood TRACE (A) NEG      Urobilinogen 0.2 0.2 - 1.0 EU/dL    Nitrites Negative NEG      Leukocyte Esterase MODERATE (A) NEG      WBC >100 0 /hpf    RBC 3-5 0 /hpf    Bacteria 1+ (H) 0 /hpf    Mucus 1+ (H) 0 /lpf    Other observations RESULTS VERIFIED MANUALLY     TYPE & SCREEN    Collection Time: 12/08/21  3:16 PM   Result Value Ref Range    Crossmatch Expiration 12/11/2021,2359     ABO/Rh(D) Bobbi Ee POSITIVE     Antibody screen NEG    COVID-19 RAPID TEST    Collection Time: 12/08/21  3:19 PM   Result Value Ref Range    Specimen source NASAL      COVID-19 rapid test Not detected NOTD     POC PT/INR    Collection Time: 12/08/21  3:34 PM   Result Value Ref Range    Prothrombin time (POC) 13.3 (H) 9.6 - 11.6 SECS    INR (POC) 1.1 0.9 - 1.2     HGB & HCT    Collection Time: 12/09/21 12:51 AM   Result Value Ref Range    HGB 11.8 (L) 13.6 - 17.2 g/dL    HCT 37.3 (L) 41.1 - 51.4 %   METABOLIC PANEL, BASIC    Collection Time: 12/09/21 12:51 AM   Result Value Ref Range    Sodium 143 136 - 145 mmol/L    Potassium 3.5 3.5 - 5.1 mmol/L    Chloride 106 98 - 107 mmol/L    CO2 30 21 - 32 mmol/L    Anion gap 7 7 - 16 mmol/L    Glucose 73 65 - 100 mg/dL    BUN 14 8 - 23 MG/DL    Creatinine 0.98 0.8 - 1.5 MG/DL    GFR est AA >60 >60 ml/min/1.73m2    GFR est non-AA >60 >60 ml/min/1.73m2    Calcium 9.8 8.3 - 99.4 MG/DL   METABOLIC PANEL, BASIC    Collection Time: 12/09/21  7:10 AM   Result Value Ref Range    Sodium 139 138 - 145 mmol/L    Potassium 3.4 (L) 3.5 - 5.1 mmol/L    Chloride 103 98 - 107 mmol/L    CO2 29 21 - 32 mmol/L    Anion gap 7 7 - 16 mmol/L    Glucose 72 65 - 100 mg/dL    BUN 14 8 - 23 MG/DL    Creatinine 0.96 0.8 - 1.5 MG/DL    GFR est AA >60 >60 ml/min/1.73m2    GFR est non-AA >60 >60 ml/min/1.73m2    Calcium 9.6 8.3 - 10.4 MG/DL   HGB & HCT Collection Time: 12/09/21  7:10 AM   Result Value Ref Range    HGB 11.6 (L) 13.6 - 17.2 g/dL    HCT 36.6 (L) 41.1 - 50.3 %       Assessment:     Active Problems:    BPH (benign prostatic hyperplasia) (12/8/2021)      POD 1:    POSTOPERATIVE DIAGNOSES:  Benign prostatic hyperplasia and recurrent urinary tract infections. PROCEDURE PERFORMED:  Transurethral section of the prostate. Afebrile, VSS    Plan:     Continue CBI. Titrate drip to keep clear and wean as tolerated. Manually irrigate as needed. Continue IVF. Ambulate. Continue incentive spirometer. Heather Voss NP  [facility]    I have reviewed the above note and examined the patient. I agree with the exam, assessment and plan. Wean CBI. Ambulate.     Ara Hawk,

## 2021-12-09 NOTE — PROGRESS NOTES
Pt is a/o x 4 on RA. CBI connected and pt is tolerating well. Pt has been ambulating in the hallway without assistance without difficulty. Pt c/o pain once during shift, PRN meds administered and awaiting feedback from pt at reassessment. No IV fluids are infusing at this time. Pt does not have IV access and will only allow one more attempt by the team to get one. IV team informed and replied they will try to get to him today. Pt is drinking fluids and eating meals. Will continue to monitor and provide care.

## 2021-12-09 NOTE — PROGRESS NOTES
Pre-surgery Prayer. [address] [place] [address] had to wait to see Pt, but Staff was kind enough to help her see him. Pt. was alert and friendly. Pt. shared about upcoming surgery and his illness. Pt. expressed appropriate concerns prior to surgery. [address] [place] [address] engaged in active listening and therapeutic communication. Pt. engaged in life review. Pt. shared about growing up in [place]. Pt expressed importance of Family especially his Wife and his Great-Grandparents. Pt. has written a history of how he grew up. Pt. expressed importance of his [employer]. [address] [place] [address] offered prayer and spiritual support if needed. Rev. RONAL Dean.Div.

## 2021-12-09 NOTE — PROGRESS NOTES
CM spoke with patient at bedside for discharge planning. Demographics and insurance verified. Patient lives with spouse. He is independent with ADLs at baseline, ambulates without assistive devices and drives. DME: [employer]  PCP: Dr. Mark Caba  Rx: [facility], [place]    Discharge plan: Patient will return home with spouse. He does not anticipated any discharge needs at this time. CM will remain available should needs arise. Care Management Interventions  PCP Verified by CM:  Yes (Dr. Mark Caba)  Mode of Transport at Discharge: Self  Discharge Durable Medical Equipment: No  Occupational Therapy Consult: No  Support Systems: Spouse/Significant Other  Confirm Follow Up Transport: Family  Discharge Location  Discharge Placement: Home with family assistance

## 2021-12-09 NOTE — PROGRESS NOTES
12/08/21 1827   Dual Skin Pressure Injury Assessment   Dual Skin Pressure Injury Assessment WDL   Second Care Provider (Based on [address] [place] [address]) Inga Crawford RN   Skin Integumentary   Skin Integumentary (WDL) WDL    Pressure  Injury Documentation No Pressure Injury Noted-Pressure Ulcer Prevention Initiated   Primary Nurse Mao Thrasher RN and Inga Crawford RN performed a dual skin assessment on this patient No impairment noted.

## 2021-12-10 VITALS
BODY MASS INDEX: 32.21 KG/M2 | SYSTOLIC BLOOD PRESSURE: 99 MMHG | TEMPERATURE: 98.1 F | OXYGEN SATURATION: 91 % | DIASTOLIC BLOOD PRESSURE: 70 MMHG | HEART RATE: 72 BPM | RESPIRATION RATE: 18 BRPM | WEIGHT: 251 LBS | HEIGHT: 74 IN

## 2021-12-10 PROCEDURE — 74011250637 HC RX REV CODE- 250/637: Performed by: UROLOGY

## 2021-12-10 PROCEDURE — 99024 POSTOP FOLLOW-UP VISIT: CPT | Performed by: UROLOGY

## 2021-12-10 PROCEDURE — 2709999900 HC NON-CHARGEABLE SUPPLY

## 2021-12-10 PROCEDURE — G0378 HOSPITAL OBSERVATION PER HR: HCPCS

## 2021-12-10 RX ORDER — HYDROCODONE BITARTRATE AND ACETAMINOPHEN 5; 325 MG/1; MG/1
1 TABLET ORAL
Qty: 10 TABLET | Refills: 0 | Status: SHIPPED | OUTPATIENT
Start: 2021-12-10 | End: 2021-12-13

## 2021-12-10 RX ADMIN — LEVETIRACETAM 1500 MG: 500 TABLET, FILM COATED ORAL at 08:07

## 2021-12-10 RX ADMIN — ATENOLOL 100 MG: 50 TABLET ORAL at 08:08

## 2021-12-10 RX ADMIN — DOCUSATE SODIUM 100 MG: 100 CAPSULE, LIQUID FILLED ORAL at 08:08

## 2021-12-10 RX ADMIN — ALLOPURINOL 300 MG: 300 TABLET ORAL at 08:08

## 2021-12-10 RX ADMIN — ASPIRIN 243 MG: 81 TABLET ORAL at 08:08

## 2021-12-10 RX ADMIN — CIPROFLOXACIN 250 MG: 250 TABLET, FILM COATED ORAL at 08:07

## 2021-12-10 RX ADMIN — OXYBUTYNIN CHLORIDE 5 MG: 5 TABLET ORAL at 07:52

## 2021-12-10 RX ADMIN — POTASSIUM CHLORIDE 20 MEQ: 20 TABLET, EXTENDED RELEASE ORAL at 08:08

## 2021-12-10 RX ADMIN — AMLODIPINE BESYLATE 10 MG: 5 TABLET ORAL at 08:08

## 2021-12-10 RX ADMIN — SPIRONOLACTONE 25 MG: 25 TABLET ORAL at 08:08

## 2021-12-10 NOTE — PROGRESS NOTES
Doing well. No complaints. AF.  VSS  Abd soft  UOP clear off CBI  No new labs. Path pending. S/P TURP POD#2  Home today after voiding trial.  RTO in 2 wks.

## 2021-12-10 NOTE — PROGRESS NOTES
Problem: Falls - Risk of  Goal: *Absence of Falls  Description: Document Bard Ray Fall Risk and appropriate interventions in the flowsheet.   Outcome: Progressing Towards Goal  Note: Fall Risk Interventions:            Medication Interventions: Evaluate medications/consider consulting pharmacy, Patient to call before getting OOB, Teach patient to arise slowly                   Problem: Patient Education: Go to Patient Education Activity  Goal: Patient/Family Education  Outcome: Progressing Towards Goal     Problem: Infection - Risk of, Urinary Catheter-Associated Urinary Tract Infection  Goal: *Absence of infection signs and symptoms  Outcome: Progressing Towards Goal     Problem: Patient Education: Go to Patient Education Activity  Goal: Patient/Family Education  Outcome: Progressing Towards Goal     Problem: Pain  Goal: *Control of Pain  Outcome: Progressing Towards Goal

## 2021-12-10 NOTE — PROGRESS NOTES
Discharge instructions given/understood by the pt. Pt is voiding without difficulty and has not taken any pain meds during the shift. Pt is ready for discharge.

## 2021-12-10 NOTE — PROGRESS NOTES
IV team went in to get IV access for pt and pt declined. Pt states he was told by the provider earlier that morning that is in not necessary and does not want to move forward. Nurse verified. Night nurse notified.

## 2021-12-10 NOTE — PROGRESS NOTES
Hourly rounds completed this shift. All needs met at this time. Bed low/locked. Call light within reach. Will continue to monitor and give bedside report to oncoming nurse. CBI infusing at slow gtt.  Urine clear/yellow

## 2021-12-10 NOTE — PROGRESS NOTES
Patient to be discharged to home today. No discharge needs/concerns have been identified. CM will remain available should needs arise. Care Management Interventions  PCP Verified by CM:  Yes  Mode of Transport at Discharge: Self  Discharge Durable Medical Equipment: No  Physical Therapy Consult: No  Occupational Therapy Consult: No  Support Systems: Spouse/Significant Other  Confirm Follow Up Transport: Family  Discharge Location  Discharge Placement: Home with family assistance

## 2021-12-10 NOTE — DISCHARGE INSTRUCTIONS
Patient Education        Transurethral Resection of the Prostate (TURP): What to Expect at [facility]     Transurethral resection of the prostate (TURP) is surgery to remove prostate tissue. It is done when an overgrown prostate gland is pressing on the urethra and making it hard to urinate. You may need a urinary catheter for a short time. It is a flexible plastic tube used to drain urine from your bladder when you can't urinate on your own. If it's still in place when you go home, your doctor will give you instructions on how to care for your catheter. For several days after surgery, you may feel burning when you urinate. Your urine may be pink for 1 to 3 weeks after surgery. You also may have bladder cramps, or spasms. Your doctor may give you medicine to help control the spasms. You may still feel like you need to urinate often in the weeks after your surgery. It often takes up to 6 weeks for this to get better. After you have healed, you may have less trouble urinating. You may have better control over starting and stopping your urine stream. And you may feel like you get more relief when you urinate. Most people can return to work or many of their usual tasks in 1 to 3 weeks. But for about 6 weeks, try to avoid heavy lifting and strenuous activities that might put extra pressure on your bladder. Most people still can have erections after surgery (if they were able to have them before surgery). But they may not ejaculate when they have an orgasm. Semen may go into the bladder instead of out through the penis. This is called retrograde ejaculation. It does not hurt and is not harmful to your health. This care sheet gives you a general idea about how long it will take for you to recover. But each person recovers at a different pace. Follow the steps below to get better as quickly as possible. How can you care for yourself at home? Activity    · Rest when you feel tired.     · Be active.  Walking is a good choice.     · Allow your body to heal. Don't move quickly or lift anything heavy until you are feeling better.     · Ask your doctor when you can drive again.     · Many people are able to return to work within 1 to 3 weeks after surgery. It depends on the type of work you do and how you feel.     · Do not put anything in your rectum, such as an enema or suppository, for 4 to 6 weeks after the surgery.     · You may shower and take baths when your doctor says it is okay.     · Ask your doctor when it is okay for you to have sex. Diet    · You can eat your normal diet. If your stomach is upset, try bland, low-fat foods like plain rice, broiled chicken, toast, and yogurt.     · If your bowel movements are not regular right after surgery, try to avoid constipation and straining. Drink plenty of water. Your doctor may suggest fiber, a stool softener, or a mild laxative. Medicines    · Your doctor will tell you if and when you can restart your medicines. He or she will also give you instructions about taking any new medicines.     · If you take aspirin or some other blood thinner, be sure to talk to your doctor. He or she will tell you if and when to start taking those medicines again. Make sure that you understand exactly what your doctor wants you to do.     · Be safe with medicines. Read and follow all instructions on the label. ? If the doctor gave you a prescription medicine for pain, take it as prescribed. ? If you are not taking a prescription pain medicine, ask your doctor if you can take an over-the-counter medicine.     · Take your antibiotics as directed. Do not stop taking them just because you feel better. You need to take the full course of antibiotics. Follow-up care is a key part of your treatment and safety. Be sure to make and go to all appointments, and call your doctor if you are having problems.  It's also a good idea to know your test results and keep a list of the medicines you take.  When should you call for help? Call 911  anytime you think you may need emergency care. For example, call if:    · You passed out (lost consciousness).     · You have chest pain, are short of breath, or cough up blood. Call your doctor now or seek immediate medical care if:    · You have pain that does not get better after you take pain medicine.     · You have new or more blood clots in your urine. (It is normal for the urine to be pink for a few days.)     · You can't pass urine.     · You have symptoms of a urinary tract infection. These may include:  ? Pain or burning when you urinate. ? A frequent need to urinate without being able to pass much urine. ? Pain in the flank, which is just below the rib cage and above the waist on either side of the back. ? Blood in your urine. ? A fever.     · You are sick to your stomach or can't keep down fluids.     · You have signs of a blood clot in your leg (called a deep vein thrombosis), such as:  ? Pain in your calf, back of the knee, thigh, or groin. ? Redness or swelling in your leg. Watch closely for changes in your health, and be sure to contact your doctor if you have problems. Where can you learn more? Go to http://www.gray.com/  Enter U071 in the search box to learn more about \"Transurethral Resection of the Prostate (TURP): What to Expect at Home. \"  Current as of: February 10, 2021               Content Version: 13.0  © 2006-2021 [employer]. Care instructions adapted under license by [employer] (which disclaims liability or warranty for this information). If you have questions about a medical condition or this instruction, always ask your healthcare professional. [place] [address] any warranty or liability for your use of this information.

## 2021-12-11 LAB
BACTERIA SPEC CULT: ABNORMAL
SERVICE CMNT-IMP: ABNORMAL

## 2022-03-18 PROBLEM — E78.2 MIXED HYPERLIPIDEMIA: Status: ACTIVE | Noted: 2019-03-20

## 2022-03-19 PROBLEM — R56.9 SEIZURE (HCC): Status: ACTIVE | Noted: 2017-03-22

## 2022-03-19 PROBLEM — E53.8 VITAMIN B 12 DEFICIENCY: Status: ACTIVE | Noted: 2019-03-20

## 2022-03-19 PROBLEM — N30.21 CHRONIC CYSTITIS WITH HEMATURIA: Status: ACTIVE | Noted: 2019-07-25

## 2022-03-19 PROBLEM — E27.8 ADRENAL MASS (HCC): Status: ACTIVE | Noted: 2018-09-07

## 2022-03-19 PROBLEM — M10.00 IDIOPATHIC GOUT: Status: ACTIVE | Noted: 2019-03-20

## 2022-03-19 PROBLEM — N40.0 BPH (BENIGN PROSTATIC HYPERPLASIA): Status: ACTIVE | Noted: 2021-12-08

## 2022-03-19 PROBLEM — N28.89 RENAL MASS: Status: ACTIVE | Noted: 2019-10-17

## 2022-03-20 PROBLEM — R60.0 LOCALIZED EDEMA: Status: ACTIVE | Noted: 2019-03-20

## 2022-06-24 DIAGNOSIS — N40.0 BENIGN PROSTATIC HYPERPLASIA WITHOUT LOWER URINARY TRACT SYMPTOMS: Primary | ICD-10-CM

## 2022-09-06 ENCOUNTER — NURSE ONLY (OUTPATIENT)
Dept: UROLOGY | Age: 72
End: 2022-09-06

## 2022-09-06 DIAGNOSIS — N40.0 BENIGN PROSTATIC HYPERPLASIA WITHOUT LOWER URINARY TRACT SYMPTOMS: ICD-10-CM

## 2022-09-12 ENCOUNTER — OFFICE VISIT (OUTPATIENT)
Dept: UROLOGY | Age: 72
End: 2022-09-12
Payer: COMMERCIAL

## 2022-09-12 DIAGNOSIS — C64.1 MALIGNANT NEOPLASM OF RIGHT KIDNEY, EXCEPT RENAL PELVIS (HCC): ICD-10-CM

## 2022-09-12 DIAGNOSIS — C61 MALIGNANT NEOPLASM OF PROSTATE (HCC): Primary | ICD-10-CM

## 2022-09-12 LAB
BILIRUBIN, URINE, POC: NEGATIVE
BLOOD URINE, POC: NORMAL
GLUCOSE URINE, POC: NEGATIVE
KETONES, URINE, POC: NEGATIVE
LEUKOCYTE ESTERASE, URINE, POC: NORMAL
NITRITE, URINE, POC: POSITIVE
PH, URINE, POC: 7 (ref 4.6–8)
PROTEIN,URINE, POC: 30
SPECIFIC GRAVITY, URINE, POC: 1.01 (ref 1–1.03)
URINALYSIS CLARITY, POC: NORMAL
URINALYSIS COLOR, POC: NORMAL
UROBILINOGEN, POC: NORMAL

## 2022-09-12 PROCEDURE — 99214 OFFICE O/P EST MOD 30 MIN: CPT | Performed by: UROLOGY

## 2022-09-12 PROCEDURE — 81003 URINALYSIS AUTO W/O SCOPE: CPT | Performed by: UROLOGY

## 2022-09-12 PROCEDURE — 1123F ACP DISCUSS/DSCN MKR DOCD: CPT | Performed by: UROLOGY

## 2022-09-12 NOTE — PROGRESS NOTES
[facility]  [place], [address]  [phone]    Becky Patel  : 1950     HPI   67 y.o., male returns in follow up for BPH, CaP and renal cancer. S/P L HALPN on . Path showed a [id number] papillary RCC with neg margins. He has done well post op. S/P R HALPN on  for a [id number] RCC with neg margins. S/P TURP on 21 for BPH and recurrent UTI's. Reports intermittent post op hematuria but voiding well. Path showed [place] 6 CaP in 21/49 cores. Last PSA was 2.6 on 21. PSA drawn last wk. Specimen lost per lab. Cr was 1.01 on 22. Voiding well. 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    Cervical stenosis of spinal canal 2009    Chronic cystitis 2019    Colloid cyst of brain (HCC)     No significant change in the appearance of 5 mm colloid cyst in the superior third ventricle- followed by Dr Donald Masterson    Constipation     occassionally- managed with PRN meds    Diverticulosis of colon     Dysarthria     hx of CVA- no current deficits    Genital herpes     GERD (gastroesophageal reflux disease)     well controlled with medication    Gout, unspecified     Headache     History of CVA (cerebrovascular accident)     no deficits    History of gastric ulcer     Hypercholesteremia     Hypertension     managed with medications    Impotence     Left renal mass     Nevus, non-neoplastic     Observed sleep apnea     pt sleeps with several pillows for elevation.  pt in the process of getting a sleep study- no cpap    Other specified disorder of male genital organs(608.89)     PUD (peptic ulcer disease)     gastric ulcer bleeding     Seizures ([address]     seizures started after stroke in 2015- pt denies any recent seizures    Testicular mass      Past Surgical History:   Procedure Laterality Date    CERVICAL FUSION  2009    COLONOSCOPY UROLOGICAL SURGERY  1985    benign tumor on Left testicle      Current Outpatient Medications   Medication Sig Dispense Refill    allopurinol (ZYLOPRIM) 300 MG tablet TAKE ONE TABLET BY MOUTH ONE TIME DAILY      amLODIPine (NORVASC) 10 MG tablet Take 10 mg by mouth daily      aspirin 81 MG EC tablet Take 243 mg by mouth daily      atenolol (TENORMIN) 100 MG tablet Take 100 mg by mouth daily      atorvastatin (LIPITOR) 80 MG tablet Take 80 mg by mouth      azelastine (ASTELIN) 0.1 % nasal spray 2 sprays by Nasal route 2 times daily      chlorthalidone (HYGROTON) 25 MG tablet Take 25 mg by mouth daily      cyanocobalamin 1000 MCG tablet Take 1,000 mcg by mouth      fluticasone (FLONASE) 50 MCG/ACT nasal spray 2 sprays by Nasal route daily      levETIRAcetam (KEPPRA) 750 MG tablet Take 1,500 mg by mouth 2 times daily      omeprazole (PRILOSEC) 20 MG delayed release capsule Take 20 mg by mouth daily      polyethylene glycol (GLYCOLAX) 17 GM/SCOOP powder Take 17 g by mouth as needed      potassium chloride (KLOR-CON M) 20 MEQ extended release tablet Take 20 mEq by mouth daily      predniSONE (DELTASONE) 20 MG tablet Take 2 pills for 3 days and then take 1 pill for 3 days. Take pills in the morning. spironolactone (ALDACTONE) 25 MG tablet TAKE ONE TABLET BY MOUTH ONE TIME DAILY      tamsulosin (FLOMAX) 0.4 MG capsule Take 0.4 mg by mouth       No current facility-administered medications for this visit. Allergies   Allergen Reactions    Azilsartan Other (See Comments)     Dizziness, loss of appetite.     Sildenafil Other (See Comments)    Sulfamethoxazole-Trimethoprim Itching    Zonisamide Other (See Comments)     confusion     Social History     Socioeconomic History    Marital status:      Spouse name: Not on file    Number of children: Not on file    Years of education: Not on file    Highest education level: Not on file   Occupational History    Not on file   Tobacco Use    Smoking status: Never Smokeless tobacco: Never   Substance and Sexual Activity    Alcohol use: No    Drug use: No    Sexual activity: Not on file   Other Topics Concern    Not on file   Social History Narrative    Not on file     Social Determinants of Health     Financial Resource Strain: Not on file   Food Insecurity: Not on file   Transportation Needs: Not on file   Physical Activity: Not on file   Stress: Not on file   Social Connections: Not on file   Intimate Partner Violence: Not on file   Housing Stability: Not on file     Family History   Problem Relation Age of Onset    Hypertension Mother     Stroke Mother     Heart Disease Mother     No Known Problems Father     Hypertension Other         gen fam hx    Hypertension Brother     Diabetes Brother     Other Brother         Kidney Transplant    Hypertension Sister     Hypertension Sister        Review of Systems  All systems reviewed and are negative at this time. Physical Exam  There were no vitals taken for this visit.   General appearance - alert, well appearing, and in no distress  Mental status - alert, oriented to person, place, and time  Eyes - extraocular eye movements intact, sclera anicteric  Abdomen - soft, nontender, nondistended, no masses or organomegaly  Neurological -  normal speech, no focal findings or movement disorder noted  Skin - normal coloration and turgor    Urinalysis  UA - Dipstick  Results for orders placed or performed in visit on 09/12/22   AMB POC URINALYSIS DIP STICK AUTO W/O MICRO   Result Value Ref Range    Color (UA POC)      Clarity (UA POC)      Glucose, Urine, POC Negative Negative    Bilirubin, Urine, POC Negative Negative    KETONES, Urine, POC Negative Negative    Specific Gravity, Urine, POC 1.015 1.001 - 1.035    Blood (UA POC) Small Negative    pH, Urine, POC 7.0 4.6 - 8.0    Protein, Urine, POC 30  Negative    Urobilinogen, POC 0.2 mg/dL     Nitrite, Urine, POC Positive Negative    Leukocyte Esterase, Urine, POC Large Negative UA - Micro  WBC - 30-50  RBC - 10-15  Bacteria - 0  Epith - 0    Assessment/Plan    ICD-10-CM    1. Malignant neoplasm of prostate (HCC)  C61 AMB POC URINALYSIS DIP STICK AUTO W/O MICRO     PSA, Diagnostic      2. Malignant neoplasm of right kidney, except renal pelvis (HCC)  C64.1 AMB POC URINALYSIS DIP STICK AUTO W/O MICRO     US RETROPERITONEAL COMPLETE        Pt refusing PSA today given recent lab draw and lost collection. Risks discussed. Will order MATI. RTO in 6 mo with PSA prior. Ucx sent. Await wolf BLOCK, DO

## 2022-09-15 ENCOUNTER — TELEPHONE (OUTPATIENT)
Dept: UROLOGY | Age: 72
End: 2022-09-15

## 2022-09-15 LAB
BACTERIA SPEC CULT: ABNORMAL
SERVICE CMNT-IMP: ABNORMAL

## 2022-09-15 RX ORDER — CIPROFLOXACIN 500 MG/1
250 TABLET, FILM COATED ORAL 2 TIMES DAILY
Qty: 14 TABLET | Refills: 0 | Status: SHIPPED | OUTPATIENT
Start: 2022-09-15 | End: 2022-09-22

## 2022-09-26 ENCOUNTER — OFFICE VISIT (OUTPATIENT)
Dept: UROLOGY | Age: 72
End: 2022-09-26
Payer: COMMERCIAL

## 2022-09-26 DIAGNOSIS — R30.0 DYSURIA: Primary | ICD-10-CM

## 2022-09-26 LAB
BILIRUBIN, URINE, POC: NEGATIVE
BLOOD URINE, POC: NORMAL
GLUCOSE URINE, POC: NEGATIVE
KETONES, URINE, POC: NEGATIVE
LEUKOCYTE ESTERASE, URINE, POC: NORMAL
NITRITE, URINE, POC: NEGATIVE
PH, URINE, POC: 6 (ref 4.6–8)
PROTEIN,URINE, POC: NEGATIVE
SPECIFIC GRAVITY, URINE, POC: 1.02 (ref 1–1.03)
URINALYSIS CLARITY, POC: NORMAL
URINALYSIS COLOR, POC: NORMAL
UROBILINOGEN, POC: NORMAL

## 2022-09-26 PROCEDURE — 81003 URINALYSIS AUTO W/O SCOPE: CPT | Performed by: NURSE PRACTITIONER

## 2022-09-26 NOTE — PROGRESS NOTES
Reason for collection: Dysuria  Patient #: [phone]  Pharmacy: [facility]    UA - Dipstick  Results for orders placed or performed in visit on 09/26/22   AMB POC URINALYSIS DIP STICK AUTO W/O MICRO   Result Value Ref Range    Color (UA POC)      Clarity (UA POC)      Glucose, Urine, POC Negative Negative    Bilirubin, Urine, POC Negative Negative    KETONES, Urine, POC Negative Negative    Specific Gravity, Urine, POC 1.025 1.001 - 1.035    Blood (UA POC) Trace Negative    pH, Urine, POC 6.0 4.6 - 8.0    Protein, Urine, POC Negative Negative    Urobilinogen, POC 0.2 mg/dL     Nitrite, Urine, POC Negative Negative    Leukocyte Esterase, Urine, POC Small Negative       UA - Micro  WBC - 0  RBC - 0  Bacteria - 0  Epith - 0      Requested Prescriptions      No prescriptions requested or ordered in this encounter     Plan    Diagnosis Orders   1. Dysuria  AMB POC URINALYSIS DIP STICK AUTO W/O MICRO            Urine appears OK. Repeat culture. HOLD on antibiotic therapy. Recently completed antibiotic.     Ellie Cain, IVONP, APRN - CNP

## 2022-09-30 LAB
BACTERIA SPEC CULT: NORMAL
SERVICE CMNT-IMP: NORMAL

## 2023-03-17 ENCOUNTER — OFFICE VISIT (OUTPATIENT)
Dept: UROLOGY | Age: 73
End: 2023-03-17

## 2023-03-17 DIAGNOSIS — R30.0 DYSURIA: Primary | ICD-10-CM

## 2023-03-17 DIAGNOSIS — N28.89 RENAL MASS: ICD-10-CM

## 2023-03-17 DIAGNOSIS — C61 PROSTATE CANCER (HCC): ICD-10-CM

## 2023-03-17 DIAGNOSIS — C64.9 MALIGNANT NEOPLASM OF KIDNEY, UNSPECIFIED LATERALITY (HCC): ICD-10-CM

## 2023-03-17 LAB
BILIRUBIN, URINE, POC: NEGATIVE
BLOOD URINE, POC: NORMAL
GLUCOSE URINE, POC: NEGATIVE
KETONES, URINE, POC: NEGATIVE
LEUKOCYTE ESTERASE, URINE, POC: NORMAL
NITRITE, URINE, POC: POSITIVE
PH, URINE, POC: 7 (ref 4.6–8)
PROTEIN,URINE, POC: NORMAL
SPECIFIC GRAVITY, URINE, POC: 1.01 (ref 1–1.03)
URINALYSIS CLARITY, POC: NORMAL
URINALYSIS COLOR, POC: NORMAL
UROBILINOGEN, POC: NORMAL

## 2023-03-17 RX ORDER — CIPROFLOXACIN 500 MG/1
500 TABLET, FILM COATED ORAL 2 TIMES DAILY
Qty: 14 TABLET | Refills: 0 | Status: SHIPPED | OUTPATIENT
Start: 2023-03-17 | End: 2023-03-24

## 2023-03-17 NOTE — PROGRESS NOTES
[facility]  [place], [address]  [phone]    Warnell Necessary  : 1950     HPI   67 y.o., male returns in follow up for BPH, CaP and renal cancer. S/P L HALPN on . Path showed a [id number] papillary RCC with neg margins. He has done well post op. S/P R HALPN on 10/15/45 for a [id number] RCC with neg margins. CT on 21 showed cont enlargement of 2.6cm LMP mass. MATI not done. S/P TURP on 21 for BPH and recurrent UTI's. Reports intermittent post op hematuria but voiding well. Path showed [place] 6 CaP in / cores. Last PSA was 2.0 on 22. Voiding well. 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    Cervical stenosis of spinal canal 2009    Chronic cystitis 2019    Colloid cyst of brain (HCC)     No significant change in the appearance of 5 mm colloid cyst in the superior third ventricle- followed by Dr Rafat Vidales    Constipation     occassionally- managed with PRN meds    Diverticulosis of colon     Dysarthria     hx of CVA- no current deficits    Genital herpes     GERD (gastroesophageal reflux disease)     well controlled with medication    Gout, unspecified     Headache     History of CVA (cerebrovascular accident)     no deficits    History of gastric ulcer     Hypercholesteremia     Hypertension     managed with medications    Impotence     Left renal mass     Nevus, non-neoplastic     Observed sleep apnea     pt sleeps with several pillows for elevation.  pt in the process of getting a sleep study- no cpap    Other specified disorder of male genital organs(608.89)     PUD (peptic ulcer disease)     gastric ulcer bleeding     Seizures ([facility] [address]     seizures started after stroke in 2015- pt denies any recent seizures    Testicular mass      Past Surgical History:   Procedure Laterality Date    CERVICAL FUSION  2009 COLONOSCOPY      UROLOGICAL SURGERY  1985    benign tumor on Left testicle      Current Outpatient Medications   Medication Sig Dispense Refill    ciprofloxacin (CIPRO) 500 MG tablet Take 1 tablet by mouth 2 times daily for 7 days 14 tablet 0    allopurinol (ZYLOPRIM) 300 MG tablet TAKE ONE TABLET BY MOUTH ONE TIME DAILY      amLODIPine (NORVASC) 10 MG tablet Take 10 mg by mouth daily      aspirin 81 MG EC tablet Take 243 mg by mouth daily      atenolol (TENORMIN) 100 MG tablet Take 100 mg by mouth daily      atorvastatin (LIPITOR) 80 MG tablet Take 80 mg by mouth      azelastine (ASTELIN) 0.1 % nasal spray 2 sprays by Nasal route 2 times daily      chlorthalidone (HYGROTON) 25 MG tablet Take 25 mg by mouth daily      cyanocobalamin 1000 MCG tablet Take 1,000 mcg by mouth      fluticasone (FLONASE) 50 MCG/ACT nasal spray 2 sprays by Nasal route daily      levETIRAcetam (KEPPRA) 750 MG tablet Take 1,500 mg by mouth 2 times daily      omeprazole (PRILOSEC) 20 MG delayed release capsule Take 20 mg by mouth daily      polyethylene glycol (GLYCOLAX) 17 GM/SCOOP powder Take 17 g by mouth as needed      potassium chloride (KLOR-CON M) 20 MEQ extended release tablet Take 20 mEq by mouth daily      predniSONE (DELTASONE) 20 MG tablet Take 2 pills for 3 days and then take 1 pill for 3 days. Take pills in the morning. spironolactone (ALDACTONE) 25 MG tablet TAKE ONE TABLET BY MOUTH ONE TIME DAILY      tamsulosin (FLOMAX) 0.4 MG capsule Take 0.4 mg by mouth       No current facility-administered medications for this visit. Allergies   Allergen Reactions    Azilsartan Other (See Comments)     Dizziness, loss of appetite.     Sildenafil Other (See Comments)    Sulfamethoxazole-Trimethoprim Itching    Zonisamide Other (See Comments)     confusion     Social History     Socioeconomic History    Marital status:      Spouse name: Not on file    Number of children: Not on file    Years of education: Not on file Highest education level: Not on file   Occupational History    Not on file   Tobacco Use    Smoking status: Never    Smokeless tobacco: Never   Substance and Sexual Activity    Alcohol use: No    Drug use: No    Sexual activity: Not on file   Other Topics Concern    Not on file   Social History Narrative    Not on file     Social Determinants of Health     Financial Resource Strain: Not on file   Food Insecurity: Not on file   Transportation Needs: Not on file   Physical Activity: Not on file   Stress: Not on file   Social Connections: Not on file   Intimate Partner Violence: Not on file   Housing Stability: Not on file     Family History   Problem Relation Age of Onset    Hypertension Mother     Stroke Mother     Heart Disease Mother     No Known Problems Father     Hypertension Other         gen fam hx    Hypertension Brother     Diabetes Brother     Other Brother         Kidney Transplant    Hypertension Sister     Hypertension Sister        Review of Systems  All systems reviewed and are negative at this time. Physical Exam  There were no vitals taken for this visit.   General appearance - alert, well appearing, and in no distress  Mental status - alert, oriented to person, place, and time  Eyes - extraocular eye movements intact, sclera anicteric  Abdomen - soft, nontender, nondistended, no masses or organomegaly  Neurological -  normal speech, no focal findings or movement disorder noted  Skin - normal coloration and turgor      Urinalysis  UA - Dipstick  Results for orders placed or performed in visit on 03/17/23   AMB POC URINALYSIS DIP STICK AUTO W/O MICRO   Result Value Ref Range    Color (UA POC)      Clarity (UA POC)      Glucose, Urine, POC Negative Negative    Bilirubin, Urine, POC Negative Negative    KETONES, Urine, POC Negative Negative    Specific Gravity, Urine, POC 1.015 1.001 - 1.035    Blood (UA POC) Small Negative    pH, Urine, POC 7.0 4.6 - 8.0    Protein, Urine, POC Trace Negative Urobilinogen, POC 0.2 mg/dL     Nitrite, Urine, POC Positive Negative    Leukocyte Esterase, Urine, POC Large Negative       UA - Micro  WBC - 30-50  RBC - 30-50  Bacteria - 1+  Epith - 0    Assessment/Plan    ICD-10-CM    1. Dysuria  R30.0 AMB POC URINALYSIS DIP STICK AUTO W/O MICRO     Culture, Urine      2. Renal mass  N28.89 CT Renal W WO Contrast      3. Prostate cancer (HCC)  C61       4. Malignant neoplasm of kidney, unspecified laterality (HCC)  C64.9         RTO in 2 wks with CT RMP protocol prior.  Ucx sent.  Cipro 500mg po bid #14.  Importance of follow up stressed.    CHARLES BLOCK, DO

## 2023-03-17 NOTE — PROGRESS NOTES
[facility]  [facility], [address]  [phone]          Jerod Umana  : 1950    Chief Complaint   Patient presents with    Follow-up          HPI     Jerod Umana is a 67 y.o. 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    Cervical stenosis of spinal canal 2009    Chronic cystitis 2019    Colloid cyst of brain (HCC)     No significant change in the appearance of 5 mm colloid cyst in the superior third ventricle- followed by Dr Sharda Hartman    Constipation     occassionally- managed with PRN meds    Diverticulosis of colon     Dysarthria     hx of CVA- no current deficits    Genital herpes     GERD (gastroesophageal reflux disease)     well controlled with medication    Gout, unspecified     Headache     History of CVA (cerebrovascular accident)     no deficits    History of gastric ulcer     Hypercholesteremia     Hypertension     managed with medications    Impotence     Left renal mass     Nevus, non-neoplastic     Observed sleep apnea     pt sleeps with several pillows for elevation.  pt in the process of getting a sleep study- no cpap    Other specified disorder of male genital organs(608.89)     PUD (peptic ulcer disease)     gastric ulcer bleeding     Seizures ([facility] [address]     seizures started after stroke in 2015- pt denies any recent seizures    Testicular mass      Past Surgical History:   Procedure Laterality Date    CERVICAL FUSION  2009    COLONOSCOPY      UROLOGICAL SURGERY      benign tumor on Left testicle      Current Outpatient Medications   Medication Sig Dispense Refill    allopurinol (ZYLOPRIM) 300 MG tablet TAKE ONE TABLET BY MOUTH ONE TIME DAILY      amLODIPine (NORVASC) 10 MG tablet Take 10 mg by mouth daily      aspirin 81 MG EC tablet Take 243 mg by mouth daily      atenolol (TENORMIN) 100 MG tablet Take 100 mg by mouth daily      atorvastatin (LIPITOR) 80 MG tablet Take 80 mg by mouth      azelastine (ASTELIN) 0.1 % nasal spray 2 sprays by Nasal route 2 times daily      chlorthalidone (HYGROTON) 25 MG tablet Take 25 mg by mouth daily      cyanocobalamin 1000 MCG tablet Take 1,000 mcg by mouth      fluticasone (FLONASE) 50 MCG/ACT nasal spray 2 sprays by Nasal route daily      levETIRAcetam (KEPPRA) 750 MG tablet Take 1,500 mg by mouth 2 times daily      omeprazole (PRILOSEC) 20 MG delayed release capsule Take 20 mg by mouth daily      polyethylene glycol (GLYCOLAX) 17 GM/SCOOP powder Take 17 g by mouth as needed      potassium chloride (KLOR-CON M) 20 MEQ extended release tablet Take 20 mEq by mouth daily      predniSONE (DELTASONE) 20 MG tablet Take 2 pills for 3 days and then take 1 pill for 3 days. Take pills in the morning. spironolactone (ALDACTONE) 25 MG tablet TAKE ONE TABLET BY MOUTH ONE TIME DAILY      tamsulosin (FLOMAX) 0.4 MG capsule Take 0.4 mg by mouth       No current facility-administered medications for this visit. Allergies   Allergen Reactions    Azilsartan Other (See Comments)     Dizziness, loss of appetite.     Sildenafil Other (See Comments)    Sulfamethoxazole-Trimethoprim Itching    Zonisamide Other (See Comments)     confusion     Social History     Socioeconomic History    Marital status:      Spouse name: Not on file    Number of children: Not on file    Years of education: Not on file    Highest education level: Not on file   Occupational History    Not on file   Tobacco Use    Smoking status: Never    Smokeless tobacco: Never   Substance and Sexual Activity    Alcohol use: No    Drug use: No    Sexual activity: Not on file   Other Topics Concern    Not on file   Social History Narrative    Not on file     Social Determinants of Health     Financial Resource Strain: Not on file   Food Insecurity: Not on file   Transportation Needs: Not on file   Physical Activity: Not on file 56y F POD1 s/p JODY-BSO.  Pt is stable and progressing normally postop. Stress: Not on file   Social Connections: Not on file   Intimate Partner Violence: Not on file   Housing Stability: Not on file     Family History   Problem Relation Age of Onset    Hypertension Mother     Stroke Mother     Heart Disease Mother     No Known Problems Father     Hypertension Other         gen fam hx    Hypertension Brother     Diabetes Brother     Other Brother         Kidney Transplant    Hypertension Sister     Hypertension Sister        ROS    Urinalysis  UA - Dipstick  Results for orders placed or performed in visit on 03/17/23   AMB POC URINALYSIS DIP STICK AUTO W/O MICRO   Result Value Ref Range    Color (UA POC)      Clarity (UA POC)      Glucose, Urine, POC Negative Negative    Bilirubin, Urine, POC Negative Negative    KETONES, Urine, POC Negative Negative    Specific Gravity, Urine, POC 1.015 1.001 - 1.035    Blood (UA POC) Small Negative    pH, Urine, POC 7.0 4.6 - 8.0    Protein, Urine, POC Trace Negative    Urobilinogen, POC 0.2 mg/dL     Nitrite, Urine, POC Positive Negative    Leukocyte Esterase, Urine, POC Large Negative           Assessment and Plan    ICD-10-CM    1.  Dysuria  R30.0 AMB POC URINALYSIS DIP STICK AUTO W/O MICRO

## 2023-03-19 LAB
BACTERIA SPEC CULT: ABNORMAL
SERVICE CMNT-IMP: ABNORMAL

## 2023-03-20 ENCOUNTER — TELEPHONE (OUTPATIENT)
Dept: UROLOGY | Age: 73
End: 2023-03-20

## 2023-03-31 ENCOUNTER — OFFICE VISIT (OUTPATIENT)
Dept: UROLOGY | Age: 73
End: 2023-03-31

## 2023-03-31 DIAGNOSIS — N28.89 RENAL MASS: ICD-10-CM

## 2023-03-31 DIAGNOSIS — R30.0 DYSURIA: Primary | ICD-10-CM

## 2023-03-31 DIAGNOSIS — C64.1 MALIGNANT NEOPLASM OF RIGHT KIDNEY, EXCEPT RENAL PELVIS (HCC): ICD-10-CM

## 2023-03-31 DIAGNOSIS — C61 PROSTATE CANCER (HCC): ICD-10-CM

## 2023-03-31 LAB
BILIRUBIN, URINE, POC: NEGATIVE
BLOOD URINE, POC: NEGATIVE
GLUCOSE URINE, POC: NEGATIVE
KETONES, URINE, POC: NEGATIVE
LEUKOCYTE ESTERASE, URINE, POC: NORMAL
NITRITE, URINE, POC: NEGATIVE
PH, URINE, POC: 7 (ref 4.6–8)
PROTEIN,URINE, POC: NEGATIVE
SPECIFIC GRAVITY, URINE, POC: 1.01 (ref 1–1.03)
URINALYSIS CLARITY, POC: NORMAL
URINALYSIS COLOR, POC: NORMAL
UROBILINOGEN, POC: NORMAL

## 2023-03-31 ASSESSMENT — ENCOUNTER SYMPTOMS
BACK PAIN: 0
NAUSEA: 0

## 2023-03-31 NOTE — PROGRESS NOTES
[facility] [address] [place] [address], [facility] [address]  [phone]          Wing Barboza  : 1950    Chief Complaint   Patient presents with    Follow-up          HPI     Wing Barboza is a 67 y.o. male here today for 2-week follow-up on urinary infection and also to discuss CT. Patient did not get a phone call about scheduling the CT it does appear that it has been ordered. Urine culture did show bacteria he was given Cipro and that was the right antibiotic. Culture was positive for Klebsiella. His urine today actually looks clear. Hx of  BPH, CaP and renal cancer. S/P L HALPN on 3/30/40. Path showed a K5gUkM1 papillary RCC with neg margins. He has done well post op. S/P R HALPN on  for a U1eRxO2 RCC with neg margins. CT on 21 showed cont enlargement of 2.6cm LMP mass. MATI not done. S/P TURP on 21 for BPH and recurrent UTI's. Reports intermittent post op hematuria but voiding well. Path showed [place] 6 CaP in 21/49 cores. Last PSA was 2.0 on 22. Voiding well.     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    Cervical stenosis of spinal canal 2009    Chronic cystitis 2019    Colloid cyst of brain (HCC)     No significant change in the appearance of 5 mm colloid cyst in the superior third ventricle- followed by Dr Melissa Brothers    Constipation     occassionally- managed with PRN meds    Diverticulosis of colon     Dysarthria     hx of CVA- no current deficits    Genital herpes     GERD (gastroesophageal reflux disease)     well controlled with medication    Gout, unspecified     Headache     History of CVA (cerebrovascular accident)     no deficits    History of gastric ulcer     Hypercholesteremia     Hypertension     managed with medications    Impotence     Left renal mass     Nevus, non-neoplastic     Observed sleep apnea     pt

## 2023-04-03 LAB
BACTERIA SPEC CULT: NORMAL
SERVICE CMNT-IMP: NORMAL

## 2023-05-25 ENCOUNTER — HOSPITAL ENCOUNTER (OUTPATIENT)
Dept: CT IMAGING | Age: 73
Discharge: HOME | End: 2023-05-27

## 2023-05-25 DIAGNOSIS — N28.89 RENAL MASS: ICD-10-CM

## 2023-05-25 RX ORDER — SODIUM CHLORIDE 0.9 % (FLUSH) 0.9 %
5-40 SYRINGE (ML) INJECTION 2 TIMES DAILY
Status: DISCONTINUED | OUTPATIENT
Start: 2023-05-25 | End: 2023-05-28 | Stop reason: HOSPADM

## 2023-05-25 RX ORDER — 0.9 % SODIUM CHLORIDE 0.9 %
100 INTRAVENOUS SOLUTION INTRAVENOUS ONCE
Status: DISCONTINUED | OUTPATIENT
Start: 2023-05-25 | End: 2023-05-28 | Stop reason: HOSPADM

## 2023-08-14 ENCOUNTER — HOSPITAL ENCOUNTER (OUTPATIENT)
Dept: CT IMAGING | Age: 73
Discharge: HOME | End: 2023-08-17
Payer: COMMERCIAL

## 2023-08-14 DIAGNOSIS — N28.89 RENAL MASS: ICD-10-CM

## 2023-08-14 LAB — CREAT BLD-MCNC: 0.84 MG/DL (ref 0.8–1.5)

## 2023-08-14 PROCEDURE — 74170 CT ABD WO CNTRST FLWD CNTRST: CPT

## 2023-08-14 PROCEDURE — 6360000004 HC RX CONTRAST MEDICATION: Performed by: NURSE PRACTITIONER

## 2023-08-14 PROCEDURE — 82565 ASSAY OF CREATININE: CPT

## 2023-08-14 RX ADMIN — IOPAMIDOL 100 ML: 755 INJECTION, SOLUTION INTRAVENOUS at 13:16

## 2024-02-08 ENCOUNTER — NURSE ONLY (OUTPATIENT)
Dept: UROLOGY | Age: 74
End: 2024-02-08

## 2024-02-08 DIAGNOSIS — C61 PROSTATE CANCER (HCC): Primary | ICD-10-CM

## 2024-02-08 DIAGNOSIS — C61 PROSTATE CANCER (HCC): ICD-10-CM

## 2024-02-08 LAB — PSA SERPL-MCNC: 2.5 NG/ML

## 2024-02-16 ENCOUNTER — OFFICE VISIT (OUTPATIENT)
Dept: UROLOGY | Age: 74
End: 2024-02-16
Payer: COMMERCIAL

## 2024-02-16 DIAGNOSIS — C61 PROSTATE CANCER (HCC): Primary | ICD-10-CM

## 2024-02-16 DIAGNOSIS — N40.1 BENIGN PROSTATIC HYPERPLASIA WITH LOWER URINARY TRACT SYMPTOMS, SYMPTOM DETAILS UNSPECIFIED: ICD-10-CM

## 2024-02-16 DIAGNOSIS — C64.9 MALIGNANT NEOPLASM OF KIDNEY, UNSPECIFIED LATERALITY (HCC): ICD-10-CM

## 2024-02-16 PROCEDURE — 99214 OFFICE O/P EST MOD 30 MIN: CPT | Performed by: UROLOGY

## 2024-02-16 PROCEDURE — 1123F ACP DISCUSS/DSCN MKR DOCD: CPT | Performed by: UROLOGY

## 2024-02-16 NOTE — PROGRESS NOTES
[facility]  [address] [place] [address]  [phone]    Estrada Quevedo  : 1950     HPI   73 y.o., male returns in follow up for BPH, CaP and renal cancer.  S/P L HALPN on 21.  Path showed a W7pFsO8 papillary RCC with neg margins.  He has done well post op.  S/P R HALPN on 10/17/19 for a P4hSnE2 RCC with neg margins.  CT on 23 showed no obvious recurrence.  S/P TURP on 21 for BPH and recurrent UTI's.   Path showed [place] 6 CaP in  cores.  Last PSA was 2.0 on 22 and is now 2.5 on 24.  Voiding well.  Denies any recent UTI's.      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    Cervical stenosis of spinal canal 2009    Chronic cystitis 2019    Colloid cyst of brain (HCC)     No significant change in the appearance of 5 mm colloid cyst in the superior third ventricle- followed by Dr Kenney    Constipation     occassionally- managed with PRN meds    Diverticulosis of colon     Dysarthria     hx of CVA- no current deficits    Genital herpes     GERD (gastroesophageal reflux disease)     well controlled with medication    Gout, unspecified     Headache     History of CVA (cerebrovascular accident)     no deficits    History of gastric ulcer     Hypercholesteremia     Hypertension     managed with medications    Impotence     Left renal mass     Nevus, non-neoplastic     Observed sleep apnea     pt sleeps with several pillows for elevation. pt in the process of getting a sleep study- no cpap    Other specified disorder of male genital organs(608.89)     PUD (peptic ulcer disease)     gastric ulcer bleeding     Seizures (HCC)     seizures started after stroke in 2015- pt denies any recent seizures    Testicular mass      Past Surgical History:   Procedure Laterality Date    CERVICAL FUSION  2009    COLONOSCOPY      UROLOGICAL SURGERY

## 2024-08-13 ENCOUNTER — TELEPHONE (OUTPATIENT)
Dept: UROLOGY | Age: 74
End: 2024-08-13

## 2024-08-13 NOTE — TELEPHONE ENCOUNTER
Tried calling patient, the one number does not work and is out of service, the second number I called and left a message on his phone letting him know that his appointment has been changed, I also sent him a [employer] message.

## 2024-10-15 ENCOUNTER — APPOINTMENT (RX ONLY)
Dept: URBAN - METROPOLITAN AREA CLINIC 24 | Facility: CLINIC | Age: 74
Setting detail: DERMATOLOGY
End: 2024-10-15

## 2024-10-15 DIAGNOSIS — L20.89 OTHER ATOPIC DERMATITIS: ICD-10-CM

## 2024-10-15 PROCEDURE — ? PRESCRIPTION MEDICATION MANAGEMENT

## 2024-10-15 PROCEDURE — ? REFERRAL CORRESPONDENCE

## 2024-10-15 PROCEDURE — 99204 OFFICE O/P NEW MOD 45 MIN: CPT

## 2024-10-15 PROCEDURE — ? COUNSELING

## 2024-10-15 PROCEDURE — ? PRESCRIPTION

## 2024-10-15 RX ORDER — TACROLIMUS 1 MG/G
OINTMENT TOPICAL
Qty: 30 | Refills: 5 | Status: CANCELLED

## 2024-10-15 ASSESSMENT — LOCATION ZONE DERM: LOCATION ZONE: TRUNK

## 2024-10-15 ASSESSMENT — LOCATION DETAILED DESCRIPTION DERM
LOCATION DETAILED: RIGHT BUTTOCK
LOCATION DETAILED: LEFT BUTTOCK

## 2024-10-15 ASSESSMENT — LOCATION SIMPLE DESCRIPTION DERM
LOCATION SIMPLE: LEFT BUTTOCK
LOCATION SIMPLE: RIGHT BUTTOCK

## 2024-10-15 NOTE — HPI: RASH
What Type Of Note Output Would You Prefer (Optional)?: Bullet Format
How Severe Is Your Rash?: mild
Is This A New Presentation, Or A Follow-Up?: Rash
Additional History: Pt states that area is discolored. It was at one time slightly itchy but is no longer itchy. He states that sometimes area is tender with pressure.\\n\\nHe states that he has seen his PCP several times for treatment and has tried Triamcinolone, Neomycin, Hydrocortisone, and Betamethasone-Clotrimazole. The only rx that seems to help is the Hydrocortisone.

## 2024-10-15 NOTE — PROCEDURE: PRESCRIPTION MEDICATION MANAGEMENT
Detail Level: Zone
Initiate Treatment: Tacrolimus 0.1 % topical ointment (Apply a thin layer to affected area on buttocks twice daily)\\nDesitin/Zinc Oxide paste (Apply a thin layer over rx twice daily.)—reapply with bowel movements
Plan: Will plan to recheck in 4 weeks\\nAlthough patient states skin is not itchy, skin is eczematous and has significant lichenification and dyspigmentation which is suggestive of chronic pruritus and inflammation. \\nTherefore, starting non steroidal to use twice daily along with strict barrier protection
Render In Strict Bullet Format?: No

## 2024-10-16 ENCOUNTER — RX ONLY (OUTPATIENT)
Age: 74
Setting detail: RX ONLY
End: 2024-10-16

## 2024-10-16 RX ORDER — TACROLIMUS 1 MG/G
OINTMENT TOPICAL
Qty: 30 | Refills: 5 | Status: ERX | COMMUNITY
Start: 2024-10-16

## 2024-11-01 ENCOUNTER — OFFICE VISIT (OUTPATIENT)
Dept: UROLOGY | Age: 74
End: 2024-11-01

## 2024-11-01 DIAGNOSIS — N40.1 BENIGN PROSTATIC HYPERPLASIA WITH LOWER URINARY TRACT SYMPTOMS, SYMPTOM DETAILS UNSPECIFIED: Primary | ICD-10-CM

## 2024-11-01 DIAGNOSIS — C64.9 MALIGNANT NEOPLASM OF KIDNEY, UNSPECIFIED LATERALITY (HCC): ICD-10-CM

## 2024-11-01 DIAGNOSIS — C61 PROSTATE CANCER (HCC): ICD-10-CM

## 2024-11-01 LAB
BILIRUBIN, URINE, POC: NEGATIVE
BLOOD URINE, POC: NORMAL
GLUCOSE URINE, POC: NEGATIVE MG/DL
KETONES, URINE, POC: NEGATIVE MG/DL
LEUKOCYTE ESTERASE, URINE, POC: NORMAL
NITRITE, URINE, POC: NEGATIVE
PH, URINE, POC: 6.5 (ref 4.6–8)
PROTEIN,URINE, POC: NEGATIVE MG/DL
PSA SERPL-MCNC: 2 NG/ML (ref 0–4)
SPECIFIC GRAVITY, URINE, POC: 1.02 (ref 1–1.03)
URINALYSIS CLARITY, POC: NORMAL
URINALYSIS COLOR, POC: NORMAL
UROBILINOGEN, POC: NORMAL MG/DL

## 2024-11-01 RX ORDER — LIDOCAINE 50 MG/G
1 OINTMENT TOPICAL DAILY
COMMUNITY

## 2024-11-01 RX ORDER — OLMESARTAN MEDOXOMIL 40 MG/1
TABLET ORAL
COMMUNITY
Start: 2024-10-26

## 2024-11-01 RX ORDER — TRIAMCINOLONE ACETONIDE 1 MG/G
OINTMENT TOPICAL 2 TIMES DAILY
COMMUNITY
Start: 2024-09-06

## 2024-11-01 RX ORDER — POTASSIUM CHLORIDE 1500 MG/1
TABLET, EXTENDED RELEASE ORAL
COMMUNITY
Start: 2024-10-25

## 2024-11-01 RX ORDER — TACROLIMUS 1 MG/G
OINTMENT TOPICAL
COMMUNITY
Start: 2024-10-16

## 2024-11-01 RX ORDER — PHENYTOIN SODIUM 100 MG/1
CAPSULE, EXTENDED RELEASE ORAL
COMMUNITY
Start: 2024-08-05

## 2024-11-01 NOTE — PROGRESS NOTES
Leukocyte Esterase, Urine, POC Small Negative         UA - Micro  WBC - 0  RBC - 0  Bacteria - 0  Epith - 0    Assessment/Plan    ICD-10-CM    1. Benign prostatic hyperplasia with lower urinary tract symptoms, symptom details unspecified  N40.1 AMB POC URINALYSIS DIP STICK AUTO W/O MICRO      2. Prostate cancer (HCC)  C61 PSA, Diagnostic     PSA, Diagnostic      3. Malignant neoplasm of kidney, unspecified laterality (HCC)  C64.9 CT ABDOMEN RENAL MASS Additional Contrast? None        PSA drawn today.  Pt instructed to call radiology to schedule his MATI.  RTO in 12 mo with CT and PSA prior.    CHARLES BLOCK,     Total time for today's encounter including chart review, result review, documentation and face to face encounter was 32 minutes.

## 2024-11-08 ENCOUNTER — HOSPITAL ENCOUNTER (OUTPATIENT)
Dept: ULTRASOUND IMAGING | Age: 74
Discharge: HOME | End: 2024-11-10
Payer: COMMERCIAL

## 2024-11-08 DIAGNOSIS — C64.9 MALIGNANT NEOPLASM OF KIDNEY, UNSPECIFIED LATERALITY (HCC): ICD-10-CM

## 2024-11-08 PROCEDURE — 76770 US EXAM ABDO BACK WALL COMP: CPT

## 2024-11-11 ENCOUNTER — TELEPHONE (OUTPATIENT)
Dept: UROLOGY | Age: 74
End: 2024-11-11

## 2024-11-11 NOTE — TELEPHONE ENCOUNTER
----- Message from Dr. Eliceo Yarbrough, DO sent at 11/11/2024  8:43 AM EST -----  Please let pt know that I see no concerning lesions in his kidneys other than cysts.  Keep follow up in 12 mo with CT prior.

## 2024-11-12 ENCOUNTER — TELEPHONE (OUTPATIENT)
Dept: UROLOGY | Age: 74
End: 2024-11-12

## 2024-11-12 NOTE — TELEPHONE ENCOUNTER
----- Message from Dr. Eliceo Yarbrough, DO sent at 11/3/2024  9:45 PM EST -----  Please let pt know that PSA remains stable.

## 2024-12-06 ENCOUNTER — OFFICE VISIT (OUTPATIENT)
Dept: FAMILY MEDICINE CLINIC | Facility: CLINIC | Age: 74
End: 2024-12-06
Payer: COMMERCIAL

## 2024-12-06 VITALS
OXYGEN SATURATION: 99 % | HEART RATE: 62 BPM | WEIGHT: 262 LBS | TEMPERATURE: 98.2 F | HEIGHT: 74 IN | DIASTOLIC BLOOD PRESSURE: 86 MMHG | BODY MASS INDEX: 33.62 KG/M2 | SYSTOLIC BLOOD PRESSURE: 142 MMHG | RESPIRATION RATE: 16 BRPM

## 2024-12-06 DIAGNOSIS — H69.91 DYSFUNCTION OF RIGHT EUSTACHIAN TUBE: Primary | ICD-10-CM

## 2024-12-06 PROCEDURE — 99212 OFFICE O/P EST SF 10 MIN: CPT | Performed by: FAMILY MEDICINE

## 2024-12-06 PROCEDURE — 3077F SYST BP >= 140 MM HG: CPT | Performed by: FAMILY MEDICINE

## 2024-12-06 PROCEDURE — 1159F MED LIST DOCD IN RCRD: CPT | Performed by: FAMILY MEDICINE

## 2024-12-06 PROCEDURE — 1123F ACP DISCUSS/DSCN MKR DOCD: CPT | Performed by: FAMILY MEDICINE

## 2024-12-06 PROCEDURE — 3079F DIAST BP 80-89 MM HG: CPT | Performed by: FAMILY MEDICINE

## 2024-12-06 SDOH — ECONOMIC STABILITY: FOOD INSECURITY: WITHIN THE PAST 12 MONTHS, YOU WORRIED THAT YOUR FOOD WOULD RUN OUT BEFORE YOU GOT MONEY TO BUY MORE.: NEVER TRUE

## 2024-12-06 SDOH — ECONOMIC STABILITY: FOOD INSECURITY: WITHIN THE PAST 12 MONTHS, THE FOOD YOU BOUGHT JUST DIDN'T LAST AND YOU DIDN'T HAVE MONEY TO GET MORE.: NEVER TRUE

## 2024-12-06 SDOH — ECONOMIC STABILITY: INCOME INSECURITY: HOW HARD IS IT FOR YOU TO PAY FOR THE VERY BASICS LIKE FOOD, HOUSING, MEDICAL CARE, AND HEATING?: NOT HARD AT ALL

## 2024-12-06 ASSESSMENT — PATIENT HEALTH QUESTIONNAIRE - PHQ9
SUM OF ALL RESPONSES TO PHQ QUESTIONS 1-9: 0
1. LITTLE INTEREST OR PLEASURE IN DOING THINGS: NOT AT ALL
SUM OF ALL RESPONSES TO PHQ9 QUESTIONS 1 & 2: 0
2. FEELING DOWN, DEPRESSED OR HOPELESS: NOT AT ALL
SUM OF ALL RESPONSES TO PHQ QUESTIONS 1-9: 0

## 2024-12-06 NOTE — PATIENT INSTRUCTIONS
Please try decongestant nasal spray like Afrin or Froy-synephrine for a few days to open up your ear tube.  I think this will all go away with a little bit of time.  Best wishes and Bianca De León!  Dr. Hernandez

## 2024-12-06 NOTE — ASSESSMENT & PLAN NOTE
He has had some congestion and drainage that I think is causing his sx.  He will try otc decong spray but mostly will just have to let this pass.

## 2024-12-06 NOTE — PROGRESS NOTES
Estrada Quevedo (:  1950) is a 74 y.o. male,{New vs Established:[id number]::\"Established patient\"}, here for evaluation of the following chief complaint(s):  Ear Fullness (Stopped up since  )         Assessment & Plan      No follow-ups on file.       Subjective   HPI    Review of Systems       Objective   Physical Exam       {Time Documentation Optional:[id number]}      An electronic signature was used to authenticate this note.    --Hilton Hernandez MD

## 2024-12-06 NOTE — PROGRESS NOTES
Estrada Quevedo (:  1950) is a 74 y.o. male,Established patient, here for evaluation of the following chief complaint(s):  Ear Fullness (Stopped up since  )         Assessment & Plan  Dysfunction of right eustachian tube    He has had some congestion and drainage that I think is causing his sx.  He will try otc decong spray but mostly will just have to let this pass.             FU PRN       Subjective   Noted that right ear seemed a little muffled when he awoke on Monday am.  Has had some crackling and popping in the ear with swallowing  worse on the right.      Ear Fullness         Review of SystemsPND and some intermittent coughing.  No sob. No high fevers.         Objective   Physical Exam  TMS nl bilaterally with no wax in EAC.  No tenderness.  No neck masses.               An electronic signature was used to authenticate this note.    --Hilton Hernandez MD

## 2025-07-21 ENCOUNTER — OFFICE VISIT (OUTPATIENT)
Dept: FAMILY MEDICINE CLINIC | Facility: CLINIC | Age: 75
End: 2025-07-21
Payer: COMMERCIAL

## 2025-07-21 VITALS
WEIGHT: 271 LBS | OXYGEN SATURATION: 99 % | RESPIRATION RATE: 18 BRPM | TEMPERATURE: 97.2 F | HEIGHT: 74 IN | HEART RATE: 67 BPM | DIASTOLIC BLOOD PRESSURE: 80 MMHG | BODY MASS INDEX: 34.78 KG/M2 | SYSTOLIC BLOOD PRESSURE: 138 MMHG

## 2025-07-21 DIAGNOSIS — L53.9 NASAL ERYTHEMA: ICD-10-CM

## 2025-07-21 DIAGNOSIS — H93.8X2 EAR FULLNESS, LEFT: Primary | ICD-10-CM

## 2025-07-21 PROCEDURE — 1159F MED LIST DOCD IN RCRD: CPT | Performed by: FAMILY MEDICINE

## 2025-07-21 PROCEDURE — 1123F ACP DISCUSS/DSCN MKR DOCD: CPT | Performed by: FAMILY MEDICINE

## 2025-07-21 PROCEDURE — 99213 OFFICE O/P EST LOW 20 MIN: CPT | Performed by: FAMILY MEDICINE

## 2025-07-21 PROCEDURE — 3079F DIAST BP 80-89 MM HG: CPT | Performed by: FAMILY MEDICINE

## 2025-07-21 PROCEDURE — 1160F RVW MEDS BY RX/DR IN RCRD: CPT | Performed by: FAMILY MEDICINE

## 2025-07-21 PROCEDURE — 3075F SYST BP GE 130 - 139MM HG: CPT | Performed by: FAMILY MEDICINE

## 2025-07-21 RX ORDER — FLUTICASONE PROPIONATE 50 MCG
2 SPRAY, SUSPENSION (ML) NASAL DAILY
Qty: 48 G | Refills: 1 | Status: SHIPPED | OUTPATIENT
Start: 2025-07-21

## 2025-07-21 SDOH — ECONOMIC STABILITY: FOOD INSECURITY: WITHIN THE PAST 12 MONTHS, YOU WORRIED THAT YOUR FOOD WOULD RUN OUT BEFORE YOU GOT MONEY TO BUY MORE.: NEVER TRUE

## 2025-07-21 SDOH — ECONOMIC STABILITY: FOOD INSECURITY: WITHIN THE PAST 12 MONTHS, THE FOOD YOU BOUGHT JUST DIDN'T LAST AND YOU DIDN'T HAVE MONEY TO GET MORE.: NEVER TRUE

## 2025-07-21 ASSESSMENT — PATIENT HEALTH QUESTIONNAIRE - PHQ9
2. FEELING DOWN, DEPRESSED OR HOPELESS: NOT AT ALL
SUM OF ALL RESPONSES TO PHQ QUESTIONS 1-9: 0
1. LITTLE INTEREST OR PLEASURE IN DOING THINGS: NOT AT ALL

## 2025-07-21 NOTE — PROGRESS NOTES
Estrada Quevedo (:  1950) is a 74 y.o. male,Established patient, here for evaluation of the following chief complaint(s):  Sinus Problem    Assessment & Plan  Sinus and left ear problem  - Symptoms suggest allergic component causing nasal irritation, leading to ear fullness  - Examination revealed no redness, irritation, or fluid in the left ear  - Prescribed Flonase for nasal irritation  - Advised to continue Flonase and perform Valsalva maneuver to pop open his ears  - Recommended chewing gum and jaw exercises for ear fullness  - Demonstrated Gal breath technique for home use    Medication management  - On chronic medications for blood pressure  - Review chronic medications and conditions  - Routine follow-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 The patient (or guardian, if applicable) and other individuals in attendance at the appointment consented to the use of AI, including the recording.      Subjective   HPI  History of Present Illness  The patient presents for evaluation of sinus and left ear problems.    He reports similar issues to those in 2025, with his right ear unaffected. Upon waking, his left ear is approximately 80% clear, but becomes muffled as the day progresses, subsiding around 6 or 7 PM. He does not experience pain in his ears, nose, or throat. He is uncertain if symptoms are due to sinus issues or weather changes. He describes a sensation of his septum being slightly blocked without pain. No fluid buildup or itching in the ear. He mentions a clicking sound when

## 2025-08-12 ENCOUNTER — OFFICE VISIT (OUTPATIENT)
Dept: FAMILY MEDICINE CLINIC | Facility: CLINIC | Age: 75
End: 2025-08-12
Payer: COMMERCIAL

## 2025-08-12 VITALS
RESPIRATION RATE: 18 BRPM | TEMPERATURE: 97.8 F | HEIGHT: 74 IN | DIASTOLIC BLOOD PRESSURE: 82 MMHG | BODY MASS INDEX: 32.85 KG/M2 | HEART RATE: 58 BPM | SYSTOLIC BLOOD PRESSURE: 140 MMHG | WEIGHT: 256 LBS | OXYGEN SATURATION: 98 %

## 2025-08-12 DIAGNOSIS — Z86.73 HISTORY OF ISCHEMIC LEFT PCA STROKE: ICD-10-CM

## 2025-08-12 DIAGNOSIS — K21.9 GASTROESOPHAGEAL REFLUX DISEASE WITHOUT ESOPHAGITIS: ICD-10-CM

## 2025-08-12 DIAGNOSIS — C64.9 MALIGNANT NEOPLASM OF KIDNEY, UNSPECIFIED LATERALITY (HCC): ICD-10-CM

## 2025-08-12 DIAGNOSIS — I10 ESSENTIAL HYPERTENSION: Primary | ICD-10-CM

## 2025-08-12 DIAGNOSIS — N40.0 BENIGN PROSTATIC HYPERPLASIA WITHOUT LOWER URINARY TRACT SYMPTOMS: ICD-10-CM

## 2025-08-12 DIAGNOSIS — M10.00 ACUTE IDIOPATHIC GOUT, UNSPECIFIED SITE: ICD-10-CM

## 2025-08-12 DIAGNOSIS — Q04.6 CONGENITAL CEREBRAL CYST (HCC): ICD-10-CM

## 2025-08-12 DIAGNOSIS — Z86.0100 HISTORY OF COLONIC POLYPS: ICD-10-CM

## 2025-08-12 PROBLEM — R56.9 SEIZURE (HCC): Status: RESOLVED | Noted: 2017-03-22 | Resolved: 2025-08-12

## 2025-08-12 PROCEDURE — 99214 OFFICE O/P EST MOD 30 MIN: CPT | Performed by: FAMILY MEDICINE

## 2025-08-12 PROCEDURE — 1123F ACP DISCUSS/DSCN MKR DOCD: CPT | Performed by: FAMILY MEDICINE

## 2025-08-12 PROCEDURE — 1160F RVW MEDS BY RX/DR IN RCRD: CPT | Performed by: FAMILY MEDICINE

## 2025-08-12 PROCEDURE — 3079F DIAST BP 80-89 MM HG: CPT | Performed by: FAMILY MEDICINE

## 2025-08-12 PROCEDURE — 3077F SYST BP >= 140 MM HG: CPT | Performed by: FAMILY MEDICINE

## 2025-08-12 PROCEDURE — 1159F MED LIST DOCD IN RCRD: CPT | Performed by: FAMILY MEDICINE

## 2025-08-12 SDOH — ECONOMIC STABILITY: FOOD INSECURITY: WITHIN THE PAST 12 MONTHS, THE FOOD YOU BOUGHT JUST DIDN'T LAST AND YOU DIDN'T HAVE MONEY TO GET MORE.: NEVER TRUE

## 2025-08-12 SDOH — ECONOMIC STABILITY: FOOD INSECURITY: WITHIN THE PAST 12 MONTHS, YOU WORRIED THAT YOUR FOOD WOULD RUN OUT BEFORE YOU GOT MONEY TO BUY MORE.: NEVER TRUE

## 2025-08-12 ASSESSMENT — PATIENT HEALTH QUESTIONNAIRE - PHQ9
SUM OF ALL RESPONSES TO PHQ QUESTIONS 1-9: 0
1. LITTLE INTEREST OR PLEASURE IN DOING THINGS: NOT AT ALL
SUM OF ALL RESPONSES TO PHQ QUESTIONS 1-9: 0
2. FEELING DOWN, DEPRESSED OR HOPELESS: NOT AT ALL

## (undated) DEVICE — LAPAROSCOPIC TROCAR SLEEVE/SINGLE USE: Brand: KII® SLEEVE

## (undated) DEVICE — TUBING INSUFFLATION SMK EVAC HI FLO SET PNEUMOCLEAR

## (undated) DEVICE — PENCIL ES L3M BTTN SWCH S STL HEX LOK BLDE ELECTRD HOLSTER

## (undated) DEVICE — TOWEL SURG W17XL27IN STD BLU COT NONFENESTRATED PREWASHED

## (undated) DEVICE — SOL ANTI-FOG 6ML MEDC -- MEDICHOICE - CONVERT TO 358427

## (undated) DEVICE — NEEDLE HYPO 21GA L1.5IN INTRAMUSCULAR S STL LATCH BVL UP

## (undated) DEVICE — TOTAL TRAY, DB, 100% SILI FOLEY, 16FR 10: Brand: MEDLINE

## (undated) DEVICE — INTENDED FOR TISSUE SEPARATION, AND OTHER PROCEDURES THAT REQUIRE A SHARP SURGICAL BLADE TO PUNCTURE OR CUT.: Brand: BARD-PARKER SAFETY BLADES SIZE 15, STERILE

## (undated) DEVICE — GENERAL LAPAROSCOPY: Brand: MEDLINE INDUSTRIES, INC.

## (undated) DEVICE — 3M™ TEGADERM™ TRANSPARENT FILM DRESSING FRAME STYLE, 1624W, 2-3/8 IN X 2-3/4 IN (6 CM X 7 CM), 100/CT 4CT/CASE: Brand: 3M™ TEGADERM™

## (undated) DEVICE — SOLUTION IRRIG 3000ML 0.9% SOD CHL FLX CONT 0797208] ICU MEDICAL INC]

## (undated) DEVICE — AGENT HEMSTAT W3XL4IN OXIDIZED REGENERATED CELOS ABSRB FOR

## (undated) DEVICE — AGENT HEMSTAT 8ML FLX TIP MTRX + DISP SURGIFLO

## (undated) DEVICE — BLADE ASSEMB CLP HAIR FINE --

## (undated) DEVICE — SOLUTION IRRIG 3000ML H2O STRL BAG

## (undated) DEVICE — SUTURE VCRL SZ 2-0 L27IN ABSRB UD L26MM SH 1/2 CIR J417H

## (undated) DEVICE — TRAY PREP DRY W/ PREM GLV 2 APPL 6 SPNG 2 UNDPD 1 OVERWRAP

## (undated) DEVICE — ACCESS PLATFORM FOR MINIMALLY INVASIVE SURGERY.: Brand: GELPORT® LAPAROSCOPIC  SYSTEM

## (undated) DEVICE — CLIP SUT ENDOSCP F/2-0/3-0/4-0 -- LAPRA-TY

## (undated) DEVICE — (D)PREP SKN CHLRAPRP APPL 26ML -- CONVERT TO ITEM 371833

## (undated) DEVICE — KENDALL SCD EXPRESS SLEEVES, KNEE LENGTH, MEDIUM: Brand: KENDALL SCD

## (undated) DEVICE — KIT,ANTI FOG,W/SPONGE & FLUID,SOFT PACK: Brand: MEDLINE

## (undated) DEVICE — SLEEVE RMFG Z-THREAD KII 3/BX -- LAWSON OEM ITEM 280412

## (undated) DEVICE — FLOSEAL HEMOSTATIC MATRIX, 10 ML: Brand: FLOSEAL

## (undated) DEVICE — DEVICE SECUREMENT 1/32IN POLYETH FOAM F ANCHR URIN CATH

## (undated) DEVICE — PROTECTOR CUSHION ULNAR NERVE --

## (undated) DEVICE — FLEXIBLE LAPAROSCOPIC ARGON ELECTRODE,COAGULATION ONLY: Brand: VALLEYLAB

## (undated) DEVICE — TROCARS: Brand: KII® OPTICAL ACCESS SYSTEM

## (undated) DEVICE — SUTURE VCRL SZ 0 L36IN ABSRB UD L36MM CT-1 1/2 CIR J946H

## (undated) DEVICE — JAR SPEC COLL C30ML 15ML PREFILL VOL POLYPR 10% NEUT BUFF

## (undated) DEVICE — TROCAR: Brand: KII FIOS FIRST ENTRY

## (undated) DEVICE — SHEARS ENDOSCP L36CM DIA5MM ULTRASONIC CRV TIP W/ ADV

## (undated) DEVICE — SPONGE LAP 18X18IN STRL -- 5/PK

## (undated) DEVICE — BAG,DRAINAGE,4L,A/R TOWER,LL,SLIDE TAP: Brand: MEDLINE

## (undated) DEVICE — CARDINAL HEALTH FLEXIBLE LIGHT HANDLE COVER: Brand: CARDINAL HEALTH

## (undated) DEVICE — REM POLYHESIVE ADULT PATIENT RETURN ELECTRODE: Brand: VALLEYLAB

## (undated) DEVICE — 3M™ IOBAN™ 2 ANTIMICROBIAL INCISE DRAPE 6650EZ: Brand: IOBAN™ 2

## (undated) DEVICE — SYR LR LCK 1ML GRAD NSAF 30ML --

## (undated) DEVICE — SYR 10ML LUER LOK 1/5ML GRAD --

## (undated) DEVICE — TROCAR: Brand: KII SLEEVE

## (undated) DEVICE — AGENT HEMSTAT W2XL14IN OXIDIZED REGENERATED CELOS ABSRB FOR

## (undated) DEVICE — APPLICATOR ENDOSCP 34CM -- SURGIFLO

## (undated) DEVICE — TRAY CATH 16F URIN MTR LTX -- CONVERT TO ITEM 363111

## (undated) DEVICE — GOWN,REINFORCE,POLY,SIRUS,XLNG/XLG: Brand: MEDLINE

## (undated) DEVICE — SUTURE PDS II SZ 1 L27IN ABSRB VLT CT-1 L36MM 1/2 CIR Z341H

## (undated) DEVICE — ARGON HANDSET: Brand: VALLEYLAB

## (undated) DEVICE — COVER,TABLE,44X76,STERILE: Brand: MEDLINE

## (undated) DEVICE — 2, DISPOSABLE SUCTION/IRRIGATOR WITHOUT DISPOSABLE TIP: Brand: STRYKEFLOW

## (undated) DEVICE — TUBING, SUCTION, 1/4" X 10', STRAIGHT: Brand: MEDLINE

## (undated) DEVICE — SOLUTION IV STRL H2O 500 ML AQUALITE POUR BTL

## (undated) DEVICE — RESERVOIR,SUCTION,100CC,SILICONE: Brand: MEDLINE

## (undated) DEVICE — Device

## (undated) DEVICE — CONTAINER SPEC FRMLN 120ML --

## (undated) DEVICE — LOGICUT SCISSOR LENGTH 320MM: Brand: LOGI - LAPAROSCOPIC INSTRUMENT SYSTEM

## (undated) DEVICE — ELECTRD CUT LP ANG 27FR BRN

## (undated) DEVICE — SURGIFOAM SPNG SZ 100

## (undated) DEVICE — BLADELESS OPTICAL TROCAR WITH FIXATION CANNULA: Brand: VERSAPORT

## (undated) DEVICE — TURP TURB: Brand: MEDLINE INDUSTRIES, INC.

## (undated) DEVICE — APPLIER CLP M L L11.4IN DIA10MM ENDOSCP ROT MULT FOR LIG

## (undated) DEVICE — TISSUE RETRIEVAL SYSTEM: Brand: INZII RETRIEVAL SYSTEM

## (undated) DEVICE — VISUALIZATION SYSTEM: Brand: CLEARIFY

## (undated) DEVICE — DRAPE,LAP,CHOLE,W/TROUGHS,STERILE: Brand: MEDLINE

## (undated) DEVICE — SUTURE VCRL SZ 2-0 L27IN ABSRB VLT L26MM SH 1/2 CIR J317H

## (undated) DEVICE — CATHETER URETH 24FR BLLN 30CC STD LTX 3 W TWO OPP DRNGE EYE

## (undated) DEVICE — APPLICATOR SEAL FLOSEAL 5MM+ --

## (undated) DEVICE — GARMENT,MEDLINE,DVT,INT,CALF,MED, GEN2: Brand: MEDLINE

## (undated) DEVICE — BLADE SCALPEL STERILE NO 10

## (undated) DEVICE — CATHETER URET 5FR L70CM TIP 8FR OPN END CONE TIP INJ HUB

## (undated) DEVICE — SUTURE VCRL SZ 0 L54IN ABSRB UD POLYGLACTIN 910 COAT BRAID J608H

## (undated) DEVICE — SOL IRR GLYC 1.5 % 3000ML --

## (undated) DEVICE — DRAIN SURG 15FR 100% SIL RND END PERF

## (undated) DEVICE — Y-TYPE TUR/BLADDER IRRIGATION SET, REGULATING CLAMP

## (undated) DEVICE — PREP SKN CHLRAPRP APL 26ML STR --

## (undated) DEVICE — [HIGH FLOW INSUFFLATOR,  DO NOT USE IF PACKAGE IS DAMAGED,  KEEP DRY,  KEEP AWAY FROM SUNLIGHT,  PROTECT FROM HEAT AND RADIOACTIVE SOURCES.]: Brand: PNEUMOSURE

## (undated) DEVICE — CYSTO/BLADDER IRRIGATION SET, REGULATING CLAMP

## (undated) DEVICE — CRADLE POS 3X5X24IN RASPBERRY ARM PRONE FOAM DISP

## (undated) DEVICE — INTENDED FOR TISSUE SEPARATION, AND OTHER PROCEDURES THAT REQUIRE A SHARP SURGICAL BLADE TO PUNCTURE OR CUT.: Brand: BARD-PARKER SAFETY BLADES SIZE 10, STERILE

## (undated) DEVICE — 2000CC GUARDIAN II: Brand: GUARDIAN

## (undated) DEVICE — CYSTO: Brand: MEDLINE INDUSTRIES, INC.

## (undated) DEVICE — SUTURE ETHLN SZ 2-0 L18IN NONABSORBABLE BLK L26MM PS 3/8 585H

## (undated) DEVICE — TELFA NON-ADHERENT ABSORBENT DRESSING: Brand: TELFA

## (undated) DEVICE — WARMER SCP STRL DISP